# Patient Record
Sex: MALE | Race: BLACK OR AFRICAN AMERICAN | NOT HISPANIC OR LATINO
[De-identification: names, ages, dates, MRNs, and addresses within clinical notes are randomized per-mention and may not be internally consistent; named-entity substitution may affect disease eponyms.]

---

## 2018-11-20 ENCOUNTER — RECORD ABSTRACTING (OUTPATIENT)
Age: 83
End: 2018-11-20

## 2018-11-20 DIAGNOSIS — Z87.891 PERSONAL HISTORY OF NICOTINE DEPENDENCE: ICD-10-CM

## 2018-11-20 DIAGNOSIS — N52.9 MALE ERECTILE DYSFUNCTION, UNSPECIFIED: ICD-10-CM

## 2018-11-20 DIAGNOSIS — M54.32 SCIATICA, LEFT SIDE: ICD-10-CM

## 2018-11-20 DIAGNOSIS — I08.0 RHEUMATIC DISORDERS OF BOTH MITRAL AND AORTIC VALVES: ICD-10-CM

## 2018-11-20 DIAGNOSIS — Z86.79 PERSONAL HISTORY OF OTHER DISEASES OF THE CIRCULATORY SYSTEM: ICD-10-CM

## 2018-11-20 DIAGNOSIS — Z87.438 PERSONAL HISTORY OF OTHER DISEASES OF MALE GENITAL ORGANS: ICD-10-CM

## 2018-12-03 ENCOUNTER — APPOINTMENT (OUTPATIENT)
Dept: CARDIOLOGY | Facility: CLINIC | Age: 83
End: 2018-12-03
Payer: MEDICARE

## 2018-12-03 VITALS
HEIGHT: 71 IN | DIASTOLIC BLOOD PRESSURE: 64 MMHG | HEART RATE: 68 BPM | BODY MASS INDEX: 28.98 KG/M2 | SYSTOLIC BLOOD PRESSURE: 116 MMHG | WEIGHT: 207 LBS

## 2018-12-03 LAB — INR PPP: 8 RATIO

## 2018-12-03 PROCEDURE — 85610 PROTHROMBIN TIME: CPT | Mod: QW

## 2018-12-03 PROCEDURE — 36415 COLL VENOUS BLD VENIPUNCTURE: CPT

## 2018-12-03 PROCEDURE — 99213 OFFICE O/P EST LOW 20 MIN: CPT

## 2018-12-03 RX ORDER — TRAVOPROST 0.04 MG/ML
0 SOLUTION/ DROPS OPHTHALMIC
Qty: 10 | Refills: 0 | Status: COMPLETED | COMMUNITY
Start: 2018-03-09

## 2018-12-03 RX ORDER — BRINZOLAMIDE 10 MG/ML
1 SUSPENSION/ DROPS OPHTHALMIC
Qty: 20 | Refills: 0 | Status: COMPLETED | COMMUNITY
Start: 2018-11-26

## 2018-12-03 RX ORDER — BRIMONIDINE TARTRATE, TIMOLOL MALEATE 2; 5 MG/ML; MG/ML
0.2-0.5 SOLUTION/ DROPS OPHTHALMIC
Qty: 5 | Refills: 0 | Status: COMPLETED | COMMUNITY
Start: 2018-03-13

## 2018-12-03 NOTE — ASSESSMENT
[FreeTextEntry1] : In office INR today shows a value greater than 8. Patient denies any blood in stool or urine. Denies any new medications or withdrawal of any medication. No dietary changes There are no  signs of bleeding or bruising. Peripheral blood draw for INR and chemistry drawn to verify.  Recommend with hold Coumadin until repeat check later this week

## 2018-12-03 NOTE — REASON FOR VISIT
[FreeTextEntry1] : On Coumadin for atrial fibrillation returns today for INR. Denies bleeding or bruising

## 2018-12-03 NOTE — DISCUSSION/SUMMARY
[FreeTextEntry1] : repeat INR in  three-days.  safety precautions discussed at high risk for bleeding

## 2018-12-03 NOTE — REVIEW OF SYSTEMS
[Recent Weight Gain (___ Lbs)] : no recent weight gain [Recent Weight Loss (___ Lbs)] : no recent weight loss [Shortness Of Breath] : no shortness of breath [Chest Pain] : no chest pain [Lower Ext Edema] : no extremity edema [Palpitations] : no palpitations [Cough] : no cough [Change In The Stool] : no change in stool [Easy Bleeding] : no tendency for easy bleeding [Easy Bruising] : no tendency for easy bruising

## 2018-12-03 NOTE — PHYSICAL EXAM
[Normal Appearance] : normal appearance [General Appearance - In No Acute Distress] : no acute distress [Auscultation Breath Sounds / Voice Sounds] : lungs were clear to auscultation bilaterally [Heart Sounds] : normal S1 and S2 [Abdomen Soft] : soft [Abnormal Walk] : normal gait [FreeTextEntry1] : Evidence of bleeding or bruising

## 2018-12-04 LAB
ALBUMIN SERPL ELPH-MCNC: 4 G/DL
ALP BLD-CCNC: 97 U/L
ALT SERPL-CCNC: 14 U/L
ANION GAP SERPL CALC-SCNC: 11 MMOL/L
AST SERPL-CCNC: 24 U/L
BILIRUB SERPL-MCNC: 1 MG/DL
BUN SERPL-MCNC: 14 MG/DL
CALCIUM SERPL-MCNC: 9.6 MG/DL
CHLORIDE SERPL-SCNC: 108 MMOL/L
CO2 SERPL-SCNC: 27 MMOL/L
CREAT SERPL-MCNC: 1.01 MG/DL
GLUCOSE SERPL-MCNC: 84 MG/DL
INR PPP: 10.88 RATIO
POTASSIUM SERPL-SCNC: 4.5 MMOL/L
PROT SERPL-MCNC: 7.4 G/DL
PT BLD: 133.5 SEC
SODIUM SERPL-SCNC: 146 MMOL/L

## 2018-12-07 ENCOUNTER — APPOINTMENT (OUTPATIENT)
Dept: CARDIOLOGY | Facility: CLINIC | Age: 83
End: 2018-12-07
Payer: MEDICARE

## 2018-12-07 LAB — INR PPP: 3.7 RATIO

## 2018-12-07 PROCEDURE — 99213 OFFICE O/P EST LOW 20 MIN: CPT

## 2018-12-07 PROCEDURE — 85610 PROTHROMBIN TIME: CPT | Mod: QW

## 2018-12-07 NOTE — REASON FOR VISIT
[FreeTextEntry1] : On Coumadin for atrial fibrillation returns today for INR. Denies bleeding or bruising.  His INR was 7.7 earlier this week. Coumadin has been on hold.  No obvious reason for elevation in INR , careful med review with patient and his wife

## 2018-12-07 NOTE — ASSESSMENT
[FreeTextEntry1] : INR down to 3.7 today.  Unknown reason for her recent elevation will resume Coumadin at 2.5 mg daily and recheck INR in 7-10 days.  Complete medication list given to wife will also verify home medications again

## 2018-12-18 ENCOUNTER — APPOINTMENT (OUTPATIENT)
Dept: CARDIOLOGY | Facility: CLINIC | Age: 83
End: 2018-12-18
Payer: MEDICARE

## 2018-12-18 ENCOUNTER — RESULT CHARGE (OUTPATIENT)
Age: 83
End: 2018-12-18

## 2018-12-18 LAB — INR PPP: 1.5 RATIO

## 2018-12-18 PROCEDURE — 85610 PROTHROMBIN TIME: CPT | Mod: QW

## 2018-12-18 PROCEDURE — 99213 OFFICE O/P EST LOW 20 MIN: CPT

## 2018-12-18 NOTE — REASON FOR VISIT
[FreeTextEntry1] : On Coumadin for atrial fibrillation returns today for INR. Denies bleeding or bruising.  Previously hiis INR was 7.7  Coumadin was held and dose adjusted..  No obvious reason for elevation in INR , Careful med review with patient and his wife.  Returns today for repeat

## 2018-12-18 NOTE — DISCUSSION/SUMMARY
[FreeTextEntry1] : increase coumadin to 5mg daily.  Vitamin K intake discussed.  return to office for INR 10 days

## 2018-12-28 ENCOUNTER — APPOINTMENT (OUTPATIENT)
Dept: CARDIOLOGY | Facility: CLINIC | Age: 83
End: 2018-12-28
Payer: MEDICARE

## 2018-12-28 LAB — INR PPP: 2 RATIO

## 2018-12-28 PROCEDURE — 85610 PROTHROMBIN TIME: CPT | Mod: QW

## 2018-12-28 PROCEDURE — 99212 OFFICE O/P EST SF 10 MIN: CPT

## 2018-12-28 NOTE — ASSESSMENT
[FreeTextEntry1] : Continue coumadin dose at 5mg daily.  Recheck INR in 3 weeks.  Vitamin K intake and consistent medication intake discussed again

## 2018-12-28 NOTE — REASON FOR VISIT
[FreeTextEntry1] : On Coumadin for atrial fibrillation returns today for INR. Denies bleeding or bruising.  \par Labile INR lately

## 2019-01-16 ENCOUNTER — APPOINTMENT (OUTPATIENT)
Dept: CARDIOLOGY | Facility: CLINIC | Age: 84
End: 2019-01-16
Payer: MEDICARE

## 2019-01-16 VITALS — DIASTOLIC BLOOD PRESSURE: 60 MMHG | SYSTOLIC BLOOD PRESSURE: 118 MMHG

## 2019-01-16 LAB — INR PPP: 3 RATIO

## 2019-01-16 PROCEDURE — 99213 OFFICE O/P EST LOW 20 MIN: CPT

## 2019-01-16 PROCEDURE — 85610 PROTHROMBIN TIME: CPT | Mod: QW

## 2019-01-16 NOTE — REASON FOR VISIT
[FreeTextEntry1] : On Coumadin for atrial fibrillation returns today for INR. Denies bleeding or bruising.  \par

## 2019-01-16 NOTE — ASSESSMENT
[FreeTextEntry1] : Therapeutic anticoagulation. Continue current Coumadin dose recheck INR one month

## 2019-02-15 ENCOUNTER — APPOINTMENT (OUTPATIENT)
Dept: CARDIOLOGY | Facility: CLINIC | Age: 84
End: 2019-02-15
Payer: MEDICARE

## 2019-02-15 LAB — INR PPP: 2.6 RATIO

## 2019-02-15 PROCEDURE — 85610 PROTHROMBIN TIME: CPT | Mod: QW

## 2019-02-15 NOTE — REASON FOR VISIT
[Anticoagulation] : anticoagulation [Atrial Fibrillation] : atrial fibrillation [FreeTextEntry1] : On Coumadin for atrial fibrillation returns today for INR. Denies bleeding or bruising.  \par

## 2019-03-15 ENCOUNTER — APPOINTMENT (OUTPATIENT)
Dept: CARDIOLOGY | Facility: CLINIC | Age: 84
End: 2019-03-15
Payer: MEDICARE

## 2019-03-15 VITALS
WEIGHT: 202 LBS | BODY MASS INDEX: 28.17 KG/M2 | SYSTOLIC BLOOD PRESSURE: 144 MMHG | DIASTOLIC BLOOD PRESSURE: 80 MMHG | HEART RATE: 64 BPM

## 2019-03-15 LAB — INR PPP: 2.2 RATIO

## 2019-03-15 PROCEDURE — 99213 OFFICE O/P EST LOW 20 MIN: CPT

## 2019-03-15 PROCEDURE — 85610 PROTHROMBIN TIME: CPT | Mod: QW

## 2019-03-15 NOTE — HISTORY OF PRESENT ILLNESS
[FreeTextEntry1] : 83-year-old male with history of atrial fibrillation, hypertension, hyperlipidemia, aortic regurgitation, mitral regurgitation, BPH, cerebrovascular disease, and glaucoma

## 2019-03-15 NOTE — ASSESSMENT
[FreeTextEntry1] : Therapeutic anticoagulation. Continue current Coumadin dose recheck INR one month\par \par Blood pressure with some elevation today. Will continue to monitor

## 2019-05-01 ENCOUNTER — APPOINTMENT (OUTPATIENT)
Dept: CARDIOLOGY | Facility: CLINIC | Age: 84
End: 2019-05-01
Payer: MEDICARE

## 2019-05-01 ENCOUNTER — NON-APPOINTMENT (OUTPATIENT)
Age: 84
End: 2019-05-01

## 2019-05-01 VITALS
HEART RATE: 49 BPM | SYSTOLIC BLOOD PRESSURE: 130 MMHG | WEIGHT: 197 LBS | HEIGHT: 71 IN | BODY MASS INDEX: 27.58 KG/M2 | DIASTOLIC BLOOD PRESSURE: 60 MMHG

## 2019-05-01 LAB — INR PPP: 1.7 RATIO

## 2019-05-01 PROCEDURE — 93000 ELECTROCARDIOGRAM COMPLETE: CPT

## 2019-05-01 PROCEDURE — 99213 OFFICE O/P EST LOW 20 MIN: CPT

## 2019-05-01 RX ORDER — METOPROLOL SUCCINATE 25 MG/1
25 TABLET, EXTENDED RELEASE ORAL DAILY
Qty: 90 | Refills: 3 | Status: DISCONTINUED | COMMUNITY
End: 2019-05-01

## 2019-05-01 NOTE — DISCUSSION/SUMMARY
[FreeTextEntry1] : The patient is doing very well clinically. I find his cardiovascular status to be stable. His INR was slightly low today at 1.7 the warfarin will be adjusted slightly upwards and dosage is 7.5 mg 3 days and 5 mg 4 days. Medications will be continued and I have asked the patient to maintain a program of regular walking exercise.Today's EKG reveals atrial fibrillation with a slow ventricular rate of 49 beats per minute. This is down from 56 at the last visit. I am therefore recommending that metoprolol be decreased from 25 mg metoprolol succinate ER to 12.5 mg.

## 2019-05-01 NOTE — PHYSICAL EXAM
[Normal Appearance] : normal appearance [General Appearance - Well Developed] : well developed [General Appearance - Well Nourished] : well nourished [No Deformities] : no deformities [Well Groomed] : well groomed [General Appearance - In No Acute Distress] : no acute distress [Normal Conjunctiva] : the conjunctiva exhibited no abnormalities [Eyelids - No Xanthelasma] : the eyelids demonstrated no xanthelasmas [No Oral Pallor] : no oral pallor [Normal Oral Mucosa] : normal oral mucosa [Normal Jugular Venous V Waves Present] : normal jugular venous V waves present [No Oral Cyanosis] : no oral cyanosis [Normal Jugular Venous A Waves Present] : normal jugular venous A waves present [No Jugular Venous [place] A Waves] : no jugular venous leavitt A waves [Auscultation Breath Sounds / Voice Sounds] : lungs were clear to auscultation bilaterally [Exaggerated Use Of Accessory Muscles For Inspiration] : no accessory muscle use [Respiration, Rhythm And Depth] : normal respiratory rhythm and effort [Heart Rate And Rhythm] : heart rate and rhythm were normal [Murmurs] : no murmurs present [Heart Sounds] : normal S1 and S2 [Abdomen Tenderness] : non-tender [Abdomen Soft] : soft [Abdomen Mass (___ Cm)] : no abdominal mass palpated [Petechial Hemorrhages (___cm)] : no petechial hemorrhages [Nail Clubbing] : no clubbing of the fingernails [Cyanosis, Localized] : no localized cyanosis [] : no ischemic changes [Skin Color & Pigmentation] : normal skin color and pigmentation [Skin Lesions] : no skin lesions [No Venous Stasis] : no venous stasis [No Skin Ulcers] : no skin ulcer [No Xanthoma] : no  xanthoma was observed [Oriented To Time, Place, And Person] : oriented to person, place, and time [Affect] : the affect was normal [No Anxiety] : not feeling anxious [Mood] : the mood was normal

## 2019-05-01 NOTE — REASON FOR VISIT
[FreeTextEntry1] : The patient is followed with the principal diagnosis of atrial fibrillation. also hypertension. The patient's clinical condition is stable he denies any cardiac symptoms. He walks 2 miles on a daily basis.

## 2019-05-01 NOTE — HISTORY OF PRESENT ILLNESS
[FreeTextEntry1] : There have been no symptoms of chest pain shortness of breath dizziness lightheadedness or palpitations.

## 2019-05-24 ENCOUNTER — APPOINTMENT (OUTPATIENT)
Dept: CARDIOLOGY | Facility: CLINIC | Age: 84
End: 2019-05-24
Payer: MEDICARE

## 2019-05-24 VITALS
DIASTOLIC BLOOD PRESSURE: 90 MMHG | BODY MASS INDEX: 27.75 KG/M2 | SYSTOLIC BLOOD PRESSURE: 150 MMHG | HEART RATE: 61 BPM | WEIGHT: 199 LBS

## 2019-05-24 LAB — INR PPP: 1.7 RATIO

## 2019-05-24 PROCEDURE — 85610 PROTHROMBIN TIME: CPT | Mod: QW

## 2019-05-24 PROCEDURE — 99213 OFFICE O/P EST LOW 20 MIN: CPT

## 2019-05-24 RX ORDER — METOPROLOL TARTRATE 25 MG/1
25 TABLET, FILM COATED ORAL DAILY
Qty: 45 | Refills: 3 | Status: DISCONTINUED | COMMUNITY
Start: 2019-05-01 | End: 2019-05-24

## 2019-05-24 NOTE — ASSESSMENT
[FreeTextEntry1] : On med review patient reports that he has been out of losartan and has perhaps been off this medication for the last 2 months. He is instructed to resume losartan 25 mg daily  recheck blood pressure next visit\par \par \par INR slightly low, Coumadin, adjusted slightly. Patient reports he has been taking boost supplements for lack of appetite consistent vitamin K intake thoroughly discussed

## 2019-05-24 NOTE — REVIEW OF SYSTEMS
[Recent Weight Loss (___ Lbs)] : no recent weight loss [Recent Weight Gain (___ Lbs)] : no recent weight gain [Shortness Of Breath] : no shortness of breath [Chest Pain] : no chest pain [Lower Ext Edema] : no extremity edema [Palpitations] : no palpitations [Cough] : no cough [Change In The Stool] : no change in stool [Easy Bruising] : no tendency for easy bruising [Easy Bleeding] : no tendency for easy bleeding

## 2019-05-24 NOTE — PHYSICAL EXAM
[Normal Appearance] : normal appearance [General Appearance - In No Acute Distress] : no acute distress [Auscultation Breath Sounds / Voice Sounds] : lungs were clear to auscultation bilaterally [Heart Sounds] : normal S1 and S2 [Abdomen Soft] : soft [Abnormal Walk] : normal gait [FreeTextEntry1] : irreg rhythm, 1+ edema bilat LE

## 2019-05-31 ENCOUNTER — RX RENEWAL (OUTPATIENT)
Age: 84
End: 2019-05-31

## 2019-06-10 ENCOUNTER — APPOINTMENT (OUTPATIENT)
Dept: CARDIOLOGY | Facility: CLINIC | Age: 84
End: 2019-06-10
Payer: MEDICARE

## 2019-06-10 VITALS — SYSTOLIC BLOOD PRESSURE: 120 MMHG | WEIGHT: 199 LBS | DIASTOLIC BLOOD PRESSURE: 60 MMHG | BODY MASS INDEX: 27.75 KG/M2

## 2019-06-10 LAB — INR PPP: 3 RATIO

## 2019-06-10 PROCEDURE — 85610 PROTHROMBIN TIME: CPT | Mod: QW

## 2019-06-10 PROCEDURE — 99213 OFFICE O/P EST LOW 20 MIN: CPT

## 2019-06-10 NOTE — REASON FOR VISIT
[Atrial Fibrillation] : atrial fibrillation [Anticoagulation] : anticoagulation [FreeTextEntry1] : On Coumadin for atrial fibrillation returns today for INR. Denies bleeding or bruising.  \par

## 2019-06-10 NOTE — REVIEW OF SYSTEMS
[Recent Weight Gain (___ Lbs)] : no recent weight gain [Chest Pain] : no chest pain [Recent Weight Loss (___ Lbs)] : no recent weight loss [Shortness Of Breath] : no shortness of breath [Lower Ext Edema] : no extremity edema [Palpitations] : no palpitations [Easy Bleeding] : no tendency for easy bleeding [Cough] : no cough [Change In The Stool] : no change in stool [Easy Bruising] : no tendency for easy bruising

## 2019-06-10 NOTE — ASSESSMENT
[FreeTextEntry1] : Therapeutic anticoagulation. Continue current treatment\par \par Blood pressure controlled continue current treatment

## 2019-07-11 ENCOUNTER — APPOINTMENT (OUTPATIENT)
Dept: CARDIOLOGY | Facility: CLINIC | Age: 84
End: 2019-07-11
Payer: MEDICARE

## 2019-07-11 VITALS
DIASTOLIC BLOOD PRESSURE: 60 MMHG | BODY MASS INDEX: 27.86 KG/M2 | HEIGHT: 71 IN | OXYGEN SATURATION: 97 % | SYSTOLIC BLOOD PRESSURE: 118 MMHG | HEART RATE: 58 BPM | WEIGHT: 199 LBS

## 2019-07-11 LAB — INR PPP: 2.8 RATIO

## 2019-07-11 PROCEDURE — 99213 OFFICE O/P EST LOW 20 MIN: CPT

## 2019-07-11 PROCEDURE — 85610 PROTHROMBIN TIME: CPT | Mod: QW

## 2019-07-11 NOTE — REASON FOR VISIT
[Anticoagulation] : anticoagulation [Atrial Fibrillation] : atrial fibrillation [Spouse] : spouse [Hypertension] : hypertension [FreeTextEntry1] : On Coumadin for atrial fibrillation. Returns today for INR. Denies bleeding or bruising.\par \par HR 58-60. Did not decrease metoprolol succinate to 12.5mg daily. No c/o dizziness or lightheadedness.

## 2019-07-11 NOTE — DISCUSSION/SUMMARY
[___ Month(s)] : [unfilled] month(s) [FreeTextEntry3] : for repeat INR [FreeTextEntry1] : \par Therapeutic INR with current Coumadin dose. Continue regimen.\par \par Continue all medications.\par \par Return in 1 month.

## 2019-07-11 NOTE — PHYSICAL EXAM
[Well Groomed] : well groomed [General Appearance - In No Acute Distress] : no acute distress [Respiration, Rhythm And Depth] : normal respiratory rhythm and effort [Auscultation Breath Sounds / Voice Sounds] : lungs were clear to auscultation bilaterally [Heart Sounds] : normal S1 and S2 [Abdomen Soft] : soft [Abnormal Walk] : normal gait [] : no rash [Oriented To Time, Place, And Person] : oriented to person, place, and time [Impaired Insight] : insight and judgment were intact [Affect] : the affect was normal [FreeTextEntry1] : HR irregularly irregular, trace bilateral lower extremity edema

## 2019-07-11 NOTE — REVIEW OF SYSTEMS
[Fever] : no fever [Headache] : no headache [Recent Weight Gain (___ Lbs)] : no recent weight gain [Chills] : no chills [Recent Weight Loss (___ Lbs)] : no recent weight loss [Feeling Fatigued] : not feeling fatigued [Blurry Vision] : no blurred vision [Shortness Of Breath] : no shortness of breath [Chest  Pressure] : no chest pressure [Dyspnea on exertion] : not dyspnea during exertion [Chest Pain] : no chest pain [Lower Ext Edema] : no extremity edema [Leg Claudication] : no intermittent leg claudication [Palpitations] : no palpitations [Cough] : no cough [Abdominal Pain] : no abdominal pain [Muscle Cramps] : no muscle cramps [Skin: A Rash] : no rash: [Excessive Thirst] : no polydipsia [Dizziness] : no dizziness [Easy Bruising] : no tendency for easy bruising [Easy Bleeding] : no tendency for easy bleeding

## 2019-07-30 ENCOUNTER — RX RENEWAL (OUTPATIENT)
Age: 84
End: 2019-07-30

## 2019-08-15 ENCOUNTER — APPOINTMENT (OUTPATIENT)
Dept: CARDIOLOGY | Facility: CLINIC | Age: 84
End: 2019-08-15
Payer: MEDICARE

## 2019-08-15 VITALS
HEIGHT: 71 IN | OXYGEN SATURATION: 95 % | SYSTOLIC BLOOD PRESSURE: 144 MMHG | HEART RATE: 62 BPM | DIASTOLIC BLOOD PRESSURE: 72 MMHG | BODY MASS INDEX: 27.72 KG/M2 | WEIGHT: 198 LBS

## 2019-08-15 LAB — INR PPP: 1.8 RATIO

## 2019-08-15 PROCEDURE — 85610 PROTHROMBIN TIME: CPT | Mod: QW

## 2019-08-15 PROCEDURE — 99213 OFFICE O/P EST LOW 20 MIN: CPT

## 2019-08-15 NOTE — DISCUSSION/SUMMARY
[With Me] : with me [___ Week(s)] : [unfilled] week(s) [FreeTextEntry1] : Change Coumadin regimen to 5mg x 6 days and 2.5mg x 1 day. Counseled on consistent Vitamin K intake. Repeat INR in 2 weeks.  [FreeTextEntry3] : repeat INR check

## 2019-08-15 NOTE — REASON FOR VISIT
[Anticoagulation] : anticoagulation [Atrial Fibrillation] : atrial fibrillation [Hypertension] : hypertension [Spouse] : spouse [FreeTextEntry1] : On Coumadin for atrial fibrillation. Returns today for INR. Denies bleeding or bruising. \par \par Denies chest pain, SOB, palpitations or dizziness.

## 2019-08-15 NOTE — REVIEW OF SYSTEMS
[Fever] : no fever [Headache] : no headache [Recent Weight Gain (___ Lbs)] : no recent weight gain [Chills] : no chills [Feeling Fatigued] : not feeling fatigued [Recent Weight Loss (___ Lbs)] : no recent weight loss [Blurry Vision] : no blurred vision [Dyspnea on exertion] : not dyspnea during exertion [Shortness Of Breath] : no shortness of breath [Chest Pain] : no chest pain [Chest  Pressure] : no chest pressure [Lower Ext Edema] : no extremity edema [Leg Claudication] : no intermittent leg claudication [Palpitations] : no palpitations [Cough] : no cough [Abdominal Pain] : no abdominal pain [Muscle Cramps] : no muscle cramps [Skin: A Rash] : no rash: [Dizziness] : no dizziness [Excessive Thirst] : no polydipsia [Easy Bruising] : no tendency for easy bruising [Easy Bleeding] : no tendency for easy bleeding

## 2019-08-15 NOTE — PHYSICAL EXAM
[Well Groomed] : well groomed [General Appearance - In No Acute Distress] : no acute distress [Auscultation Breath Sounds / Voice Sounds] : lungs were clear to auscultation bilaterally [Heart Sounds] : normal S1 and S2 [Abdomen Soft] : soft [Abnormal Walk] : normal gait [] : no rash [Oriented To Time, Place, And Person] : oriented to person, place, and time [Impaired Insight] : insight and judgment were intact [Affect] : the affect was normal [FreeTextEntry1] : HR irregularly irregular, trace bilateral lower extremity edema

## 2019-08-29 ENCOUNTER — APPOINTMENT (OUTPATIENT)
Dept: CARDIOLOGY | Facility: CLINIC | Age: 84
End: 2019-08-29
Payer: MEDICARE

## 2019-08-29 VITALS
OXYGEN SATURATION: 97 % | SYSTOLIC BLOOD PRESSURE: 132 MMHG | BODY MASS INDEX: 27.16 KG/M2 | HEIGHT: 71 IN | WEIGHT: 194 LBS | HEART RATE: 69 BPM | DIASTOLIC BLOOD PRESSURE: 78 MMHG

## 2019-08-29 LAB — INR PPP: 1.4 RATIO

## 2019-08-29 PROCEDURE — 99213 OFFICE O/P EST LOW 20 MIN: CPT

## 2019-08-29 PROCEDURE — 85610 PROTHROMBIN TIME: CPT | Mod: QW

## 2019-08-29 NOTE — REVIEW OF SYSTEMS
[Fever] : no fever [Recent Weight Gain (___ Lbs)] : no recent weight gain [Headache] : no headache [Chills] : no chills [Blurry Vision] : no blurred vision [Feeling Fatigued] : not feeling fatigued [Recent Weight Loss (___ Lbs)] : no recent weight loss [Shortness Of Breath] : no shortness of breath [Chest  Pressure] : no chest pressure [Dyspnea on exertion] : not dyspnea during exertion [Lower Ext Edema] : no extremity edema [Leg Claudication] : no intermittent leg claudication [Chest Pain] : no chest pain [Palpitations] : no palpitations [Cough] : no cough [Abdominal Pain] : no abdominal pain [Skin: A Rash] : no rash: [Muscle Cramps] : no muscle cramps [Dizziness] : no dizziness [Confusion] : no confusion was observed [Excessive Thirst] : no polydipsia [Easy Bruising] : no tendency for easy bruising [Easy Bleeding] : no tendency for easy bleeding

## 2019-08-29 NOTE — REASON FOR VISIT
[Atrial Fibrillation] : atrial fibrillation [Anticoagulation] : anticoagulation [Spouse] : spouse [FreeTextEntry1] : Subtherapeutic INR at last visit, 1.8. Coumadin dosing changed to 7.5mg x 6 days and 5mg x 1 day. \par \par Denies s/s of bleeding or easy bruising. \par \par Denies any change in diet or intake of Vitamin K rich food. No new medications except for over the counter Miralax and stool softener.  Denies missed doses of Coumadin.

## 2019-08-29 NOTE — ASSESSMENT
[FreeTextEntry1] : Remains with subtherapeutic INR., 1.3 initially with repeat 1.4. \par \par Instructed to take 10mg Coumadin tonight then start 7.5mg daily. Repeat INR in 2 weeks. \par \par Continue monitoring diet and avoid Vitamin K rich food including green leafy vegetables, teas, pomegranate or cranberry juice. Advised to avoid high protein diet. \par

## 2019-08-29 NOTE — PHYSICAL EXAM
[Well Groomed] : well groomed [General Appearance - In No Acute Distress] : no acute distress [Respiration, Rhythm And Depth] : normal respiratory rhythm and effort [Auscultation Breath Sounds / Voice Sounds] : lungs were clear to auscultation bilaterally [Heart Sounds] : normal S1 and S2 [FreeTextEntry1] : HR irregularly irregular, trace bilateral lower extremity edema [Abdomen Soft] : soft [Oriented To Time, Place, And Person] : oriented to person, place, and time [Abnormal Walk] : normal gait [] : no rash [Affect] : the affect was normal [Impaired Insight] : insight and judgment were intact

## 2019-09-11 ENCOUNTER — APPOINTMENT (OUTPATIENT)
Dept: CARDIOLOGY | Facility: CLINIC | Age: 84
End: 2019-09-11

## 2019-09-13 ENCOUNTER — APPOINTMENT (OUTPATIENT)
Dept: CARDIOLOGY | Facility: CLINIC | Age: 84
End: 2019-09-13
Payer: MEDICARE

## 2019-09-13 LAB — INR PPP: 1.7 RATIO

## 2019-09-13 PROCEDURE — 99211 OFF/OP EST MAY X REQ PHY/QHP: CPT

## 2019-09-13 PROCEDURE — 85610 PROTHROMBIN TIME: CPT | Mod: QW

## 2019-09-13 NOTE — ASSESSMENT
[FreeTextEntry1] : improtance of med compliance discussed\par \par extra 5mg today then continue 7.5mg daily, wife will assist with making sure dose is correct\par \par recheck INR 2w

## 2019-09-13 NOTE — REASON FOR VISIT
[Anticoagulation] : anticoagulation [Atrial Fibrillation] : atrial fibrillation [FreeTextEntry1] : On Coumadin for atrial fibrillation returns today for INR. Denies bleeding or bruising.  \par INR has been low, dose increased.  Wife is questioning if he took the increased dose

## 2019-09-27 ENCOUNTER — APPOINTMENT (OUTPATIENT)
Dept: CARDIOLOGY | Facility: CLINIC | Age: 84
End: 2019-09-27
Payer: MEDICARE

## 2019-09-27 VITALS
HEART RATE: 68 BPM | SYSTOLIC BLOOD PRESSURE: 128 MMHG | DIASTOLIC BLOOD PRESSURE: 70 MMHG | WEIGHT: 202 LBS | BODY MASS INDEX: 28.17 KG/M2

## 2019-09-27 DIAGNOSIS — Z51.81 ENCOUNTER FOR THERAPEUTIC DRUG LVL MONITORING: ICD-10-CM

## 2019-09-27 LAB — INR PPP: 1.8 RATIO

## 2019-09-27 PROCEDURE — 99213 OFFICE O/P EST LOW 20 MIN: CPT

## 2019-09-27 PROCEDURE — 85610 PROTHROMBIN TIME: CPT | Mod: QW

## 2019-09-27 PROCEDURE — 90653 IIV ADJUVANT VACCINE IM: CPT

## 2019-09-27 PROCEDURE — G0008: CPT

## 2019-09-27 NOTE — REASON FOR VISIT
[Atrial Fibrillation] : atrial fibrillation [Anticoagulation] : anticoagulation [FreeTextEntry1] : On Coumadin for atrial fibrillation returns today for INR. Denies bleeding or bruising.  \par INR has been low, dose increased last 2 visits

## 2019-09-27 NOTE — REVIEW OF SYSTEMS
[Recent Weight Gain (___ Lbs)] : no recent weight gain [Shortness Of Breath] : no shortness of breath [Recent Weight Loss (___ Lbs)] : no recent weight loss [Lower Ext Edema] : no extremity edema [Chest Pain] : no chest pain [Palpitations] : no palpitations [Cough] : no cough [Change In The Stool] : no change in stool [Easy Bleeding] : no tendency for easy bleeding [Easy Bruising] : no tendency for easy bruising

## 2019-09-27 NOTE — ASSESSMENT
[FreeTextEntry1] : importance of med compliance discussed\par \par slightly low INR today, but improved from last check.  Dose has been increased last 2 visits.  Will continue at this dose for now and reassess next visit\par \par

## 2019-10-08 ENCOUNTER — RX RENEWAL (OUTPATIENT)
Age: 84
End: 2019-10-08

## 2019-10-21 ENCOUNTER — APPOINTMENT (OUTPATIENT)
Dept: CARDIOLOGY | Facility: CLINIC | Age: 84
End: 2019-10-21
Payer: MEDICARE

## 2019-10-21 VITALS
BODY MASS INDEX: 28.45 KG/M2 | WEIGHT: 204 LBS | DIASTOLIC BLOOD PRESSURE: 60 MMHG | HEART RATE: 62 BPM | SYSTOLIC BLOOD PRESSURE: 134 MMHG

## 2019-10-21 LAB — INR PPP: 2 RATIO

## 2019-10-21 PROCEDURE — 85610 PROTHROMBIN TIME: CPT | Mod: QW

## 2019-10-21 PROCEDURE — 99213 OFFICE O/P EST LOW 20 MIN: CPT

## 2019-10-21 NOTE — REVIEW OF SYSTEMS
[Recent Weight Gain (___ Lbs)] : no recent weight gain [Shortness Of Breath] : no shortness of breath [Recent Weight Loss (___ Lbs)] : no recent weight loss [Chest Pain] : no chest pain [Lower Ext Edema] : no extremity edema [Cough] : no cough [Change In The Stool] : no change in stool [Palpitations] : no palpitations [Easy Bleeding] : no tendency for easy bleeding [Easy Bruising] : no tendency for easy bruising

## 2019-11-04 ENCOUNTER — NON-APPOINTMENT (OUTPATIENT)
Age: 84
End: 2019-11-04

## 2019-11-04 ENCOUNTER — APPOINTMENT (OUTPATIENT)
Dept: CARDIOLOGY | Facility: CLINIC | Age: 84
End: 2019-11-04
Payer: MEDICARE

## 2019-11-04 VITALS
WEIGHT: 205 LBS | BODY MASS INDEX: 28.7 KG/M2 | SYSTOLIC BLOOD PRESSURE: 132 MMHG | HEART RATE: 51 BPM | DIASTOLIC BLOOD PRESSURE: 60 MMHG | HEIGHT: 71 IN

## 2019-11-04 PROCEDURE — 93000 ELECTROCARDIOGRAM COMPLETE: CPT

## 2019-11-04 PROCEDURE — 99214 OFFICE O/P EST MOD 30 MIN: CPT

## 2019-11-04 RX ORDER — FLUTICASONE PROPIONATE 50 MCG
50 SPRAY, SUSPENSION NASAL
Refills: 0 | Status: DISCONTINUED | COMMUNITY
End: 2019-11-04

## 2019-11-04 NOTE — PHYSICAL EXAM
[General Appearance - Well Developed] : well developed [Normal Appearance] : normal appearance [Well Groomed] : well groomed [General Appearance - Well Nourished] : well nourished [No Deformities] : no deformities [General Appearance - In No Acute Distress] : no acute distress [Normal Conjunctiva] : the conjunctiva exhibited no abnormalities [Eyelids - No Xanthelasma] : the eyelids demonstrated no xanthelasmas [Normal Oral Mucosa] : normal oral mucosa [No Oral Pallor] : no oral pallor [No Oral Cyanosis] : no oral cyanosis [Normal Jugular Venous A Waves Present] : normal jugular venous A waves present [Normal Jugular Venous V Waves Present] : normal jugular venous V waves present [No Jugular Venous [place] A Waves] : no jugular venous leavitt A waves [Respiration, Rhythm And Depth] : normal respiratory rhythm and effort [Exaggerated Use Of Accessory Muscles For Inspiration] : no accessory muscle use [Auscultation Breath Sounds / Voice Sounds] : lungs were clear to auscultation bilaterally [Heart Rate And Rhythm] : heart rate and rhythm were normal [Heart Sounds] : normal S1 and S2 [Murmurs] : no murmurs present [Abdomen Soft] : soft [Abdomen Tenderness] : non-tender [Abdomen Mass (___ Cm)] : no abdominal mass palpated [Abnormal Walk] : normal gait [Gait - Sufficient For Exercise Testing] : the gait was sufficient for exercise testing [Nail Clubbing] : no clubbing of the fingernails [Cyanosis, Localized] : no localized cyanosis [Petechial Hemorrhages (___cm)] : no petechial hemorrhages [Skin Color & Pigmentation] : normal skin color and pigmentation [] : no rash [No Venous Stasis] : no venous stasis [Skin Lesions] : no skin lesions [No Skin Ulcers] : no skin ulcer [No Xanthoma] : no  xanthoma was observed [Oriented To Time, Place, And Person] : oriented to person, place, and time [Affect] : the affect was normal [Mood] : the mood was normal [No Anxiety] : not feeling anxious

## 2019-11-04 NOTE — DISCUSSION/SUMMARY
[FreeTextEntry1] : The patient's cardiac status remained stable the patient is followed with chronic atrial fibrillation and mild valvular disease as I have described. The examination reveals a grade 1-2 systolic murmur at the lower left sternal border and apex. The electrocardiogram reveals atrial fibrillation with controlled ventricular rate 59 no acute changes are noted. I have asked the patient to continue his current medications. He receives warfarin anticoagulation which is followed regularly. Of great concern is the need for her general primary medical followup and the patient assures me that he will be seeing Dr. Chun for this.

## 2019-11-15 ENCOUNTER — RX RENEWAL (OUTPATIENT)
Age: 84
End: 2019-11-15

## 2019-11-18 ENCOUNTER — RX RENEWAL (OUTPATIENT)
Age: 84
End: 2019-11-18

## 2019-11-20 ENCOUNTER — APPOINTMENT (OUTPATIENT)
Dept: CARDIOLOGY | Facility: CLINIC | Age: 84
End: 2019-11-20
Payer: MEDICARE

## 2019-11-20 VITALS
SYSTOLIC BLOOD PRESSURE: 126 MMHG | DIASTOLIC BLOOD PRESSURE: 60 MMHG | WEIGHT: 209 LBS | BODY MASS INDEX: 29.15 KG/M2 | HEART RATE: 65 BPM

## 2019-11-20 LAB — INR PPP: 2.3 RATIO

## 2019-11-20 PROCEDURE — 99213 OFFICE O/P EST LOW 20 MIN: CPT

## 2019-11-20 PROCEDURE — 85610 PROTHROMBIN TIME: CPT | Mod: QW

## 2019-11-20 NOTE — PHYSICAL EXAM
[General Appearance - In No Acute Distress] : no acute distress [Auscultation Breath Sounds / Voice Sounds] : lungs were clear to auscultation bilaterally [Normal Appearance] : normal appearance [Heart Sounds] : normal S1 and S2 [Abnormal Walk] : normal gait [Abdomen Soft] : soft [FreeTextEntry1] : no Evidence of bleeding or bruising

## 2019-11-20 NOTE — REASON FOR VISIT
[Anticoagulation] : anticoagulation [Atrial Fibrillation] : atrial fibrillation [FreeTextEntry1] : On Coumadin for atrial fibrillation returns today for INR. Denies bleeding or bruising.  \par  [Spouse] : spouse

## 2019-11-20 NOTE — REVIEW OF SYSTEMS
[Shortness Of Breath] : no shortness of breath [Recent Weight Gain (___ Lbs)] : no recent weight gain [Recent Weight Loss (___ Lbs)] : no recent weight loss [Palpitations] : no palpitations [Lower Ext Edema] : no extremity edema [Chest Pain] : no chest pain [Cough] : no cough [Change In The Stool] : no change in stool [Easy Bruising] : no tendency for easy bruising [Easy Bleeding] : no tendency for easy bleeding

## 2019-12-12 ENCOUNTER — APPOINTMENT (OUTPATIENT)
Dept: FAMILY MEDICINE | Facility: CLINIC | Age: 84
End: 2019-12-12
Payer: MEDICARE

## 2019-12-12 VITALS
BODY MASS INDEX: 28.84 KG/M2 | SYSTOLIC BLOOD PRESSURE: 120 MMHG | WEIGHT: 206 LBS | DIASTOLIC BLOOD PRESSURE: 70 MMHG | HEIGHT: 71 IN

## 2019-12-12 DIAGNOSIS — H40.9 UNSPECIFIED GLAUCOMA: ICD-10-CM

## 2019-12-12 DIAGNOSIS — H25.9 UNSPECIFIED AGE-RELATED CATARACT: ICD-10-CM

## 2019-12-12 PROCEDURE — 36415 COLL VENOUS BLD VENIPUNCTURE: CPT

## 2019-12-12 PROCEDURE — 99214 OFFICE O/P EST MOD 30 MIN: CPT | Mod: 25

## 2019-12-12 NOTE — PHYSICAL EXAM
[No Focal Deficits] : no focal deficits [No Acute Distress] : no acute distress [Clear to Auscultation] : lungs were clear to auscultation bilaterally [de-identified] : Unable to calculate meaningful MMSE due to visual impairment, but Orientation and recall are intact [de-identified] : irreg/irreg

## 2019-12-12 NOTE — PLAN
[FreeTextEntry1] : Routine labs today.\par Schedule CPE.\par Pt recalls flu vax done at Dr Zavaleta's office. Will check record.

## 2019-12-12 NOTE — HISTORY OF PRESENT ILLNESS
[FreeTextEntry8] : "I thought this was a physical"\par \par \par Pt reports that spouse is concerned about his mental status.\par He feels that he is fine.

## 2019-12-12 NOTE — REVIEW OF SYSTEMS
[Fever] : no fever [Chills] : no chills [Vision Problems] : vision problems [Chest Pain] : no chest pain [Cough] : no cough [Palpitations] : no palpitations [Abdominal Pain] : no abdominal pain [Headache] : no headache [Constipation] : constipation [Confusion] : no confusion [Unsteady Walk] : no ataxia [Dizziness] : no dizziness [FreeTextEntry7] : HHard stool. Moves bowels every other day.

## 2019-12-18 ENCOUNTER — APPOINTMENT (OUTPATIENT)
Dept: CARDIOLOGY | Facility: CLINIC | Age: 84
End: 2019-12-18
Payer: MEDICARE

## 2019-12-18 VITALS
SYSTOLIC BLOOD PRESSURE: 118 MMHG | DIASTOLIC BLOOD PRESSURE: 70 MMHG | HEART RATE: 67 BPM | WEIGHT: 207 LBS | BODY MASS INDEX: 28.87 KG/M2

## 2019-12-18 LAB — INR PPP: 2 RATIO

## 2019-12-18 PROCEDURE — 85610 PROTHROMBIN TIME: CPT | Mod: QW

## 2019-12-18 PROCEDURE — 99213 OFFICE O/P EST LOW 20 MIN: CPT

## 2019-12-18 NOTE — REVIEW OF SYSTEMS
[Recent Weight Loss (___ Lbs)] : no recent weight loss [Recent Weight Gain (___ Lbs)] : no recent weight gain [Shortness Of Breath] : no shortness of breath [Chest Pain] : no chest pain [Lower Ext Edema] : no extremity edema [Palpitations] : no palpitations [Change In The Stool] : no change in stool [Cough] : no cough [Easy Bleeding] : no tendency for easy bleeding [Easy Bruising] : no tendency for easy bruising

## 2019-12-18 NOTE — REASON FOR VISIT
[Atrial Fibrillation] : atrial fibrillation [Anticoagulation] : anticoagulation [Spouse] : spouse [FreeTextEntry1] : On Coumadin for atrial fibrillation returns today for INR. Denies bleeding or bruising.  \par

## 2019-12-18 NOTE — PHYSICAL EXAM
[Normal Appearance] : normal appearance [General Appearance - In No Acute Distress] : no acute distress [Auscultation Breath Sounds / Voice Sounds] : lungs were clear to auscultation bilaterally [Heart Sounds] : normal S1 and S2 [Abdomen Soft] : soft [Abnormal Walk] : normal gait [FreeTextEntry1] : no Evidence of bleeding or bruising

## 2019-12-24 LAB
ALBUMIN SERPL ELPH-MCNC: 4.2 G/DL
ALP BLD-CCNC: 104 U/L
ALT SERPL-CCNC: 16 U/L
ANION GAP SERPL CALC-SCNC: 13 MMOL/L
AST SERPL-CCNC: 25 U/L
BASOPHILS # BLD AUTO: 0.03 K/UL
BASOPHILS NFR BLD AUTO: 0.9 %
BILIRUB SERPL-MCNC: 1 MG/DL
BUN SERPL-MCNC: 13 MG/DL
CALCIUM SERPL-MCNC: 9.5 MG/DL
CHLORIDE SERPL-SCNC: 105 MMOL/L
CHOLEST SERPL-MCNC: 103 MG/DL
CHOLEST/HDLC SERPL: 2.9 RATIO
CO2 SERPL-SCNC: 24 MMOL/L
CREAT SERPL-MCNC: 0.88 MG/DL
EOSINOPHIL # BLD AUTO: 0.04 K/UL
EOSINOPHIL NFR BLD AUTO: 1.2 %
GLUCOSE SERPL-MCNC: 124 MG/DL
HCT VFR BLD CALC: 45.7 %
HDLC SERPL-MCNC: 36 MG/DL
HGB BLD-MCNC: 14.5 G/DL
IMM GRANULOCYTES NFR BLD AUTO: 0.3 %
LDLC SERPL CALC-MCNC: 47 MG/DL
LYMPHOCYTES # BLD AUTO: 1.5 K/UL
LYMPHOCYTES NFR BLD AUTO: 46.3 %
MAN DIFF?: NORMAL
MCHC RBC-ENTMCNC: 28.4 PG
MCHC RBC-ENTMCNC: 31.7 GM/DL
MCV RBC AUTO: 89.6 FL
MONOCYTES # BLD AUTO: 0.3 K/UL
MONOCYTES NFR BLD AUTO: 9.3 %
NEUTROPHILS # BLD AUTO: 1.36 K/UL
NEUTROPHILS NFR BLD AUTO: 42 %
PLATELET # BLD AUTO: 121 K/UL
POTASSIUM SERPL-SCNC: 3.8 MMOL/L
PROT SERPL-MCNC: 7.5 G/DL
RBC # BLD: 5.1 M/UL
RBC # FLD: 14.6 %
SODIUM SERPL-SCNC: 142 MMOL/L
TRIGL SERPL-MCNC: 98 MG/DL
WBC # FLD AUTO: 3.24 K/UL

## 2020-01-22 ENCOUNTER — APPOINTMENT (OUTPATIENT)
Dept: CARDIOLOGY | Facility: CLINIC | Age: 85
End: 2020-01-22
Payer: MEDICARE

## 2020-01-22 VITALS
HEART RATE: 64 BPM | BODY MASS INDEX: 29.01 KG/M2 | WEIGHT: 208 LBS | DIASTOLIC BLOOD PRESSURE: 60 MMHG | SYSTOLIC BLOOD PRESSURE: 124 MMHG

## 2020-01-22 LAB — INR PPP: 2.5 RATIO

## 2020-01-22 PROCEDURE — 85610 PROTHROMBIN TIME: CPT | Mod: QW

## 2020-01-22 PROCEDURE — 99213 OFFICE O/P EST LOW 20 MIN: CPT

## 2020-01-22 NOTE — PHYSICAL EXAM
[Normal Appearance] : normal appearance [General Appearance - In No Acute Distress] : no acute distress [Heart Sounds] : normal S1 and S2 [Auscultation Breath Sounds / Voice Sounds] : lungs were clear to auscultation bilaterally [Abdomen Soft] : soft [Abnormal Walk] : normal gait [FreeTextEntry1] : no Evidence of bleeding or bruising

## 2020-01-22 NOTE — REVIEW OF SYSTEMS
[Recent Weight Gain (___ Lbs)] : no recent weight gain [Recent Weight Loss (___ Lbs)] : no recent weight loss [Shortness Of Breath] : no shortness of breath [Chest Pain] : no chest pain [Lower Ext Edema] : no extremity edema [Palpitations] : no palpitations [Cough] : no cough [Easy Bleeding] : no tendency for easy bleeding [Change In The Stool] : no change in stool [Easy Bruising] : no tendency for easy bruising

## 2020-02-26 ENCOUNTER — APPOINTMENT (OUTPATIENT)
Dept: CARDIOLOGY | Facility: CLINIC | Age: 85
End: 2020-02-26

## 2020-03-04 ENCOUNTER — APPOINTMENT (OUTPATIENT)
Dept: CARDIOLOGY | Facility: CLINIC | Age: 85
End: 2020-03-04
Payer: MEDICARE

## 2020-03-04 VITALS
WEIGHT: 209 LBS | OXYGEN SATURATION: 97 % | SYSTOLIC BLOOD PRESSURE: 120 MMHG | HEART RATE: 81 BPM | BODY MASS INDEX: 29.15 KG/M2 | DIASTOLIC BLOOD PRESSURE: 78 MMHG

## 2020-03-04 LAB — INR PPP: 1.7 RATIO

## 2020-03-04 PROCEDURE — 99213 OFFICE O/P EST LOW 20 MIN: CPT

## 2020-03-04 PROCEDURE — 85610 PROTHROMBIN TIME: CPT | Mod: QW

## 2020-03-04 NOTE — REASON FOR VISIT
[Anticoagulation] : anticoagulation [Atrial Fibrillation] : atrial fibrillation [Spouse] : spouse [FreeTextEntry1] : On Coumadin for atrial fibrillation returns today for INR. Denies bleeding or bruising.  \par

## 2020-03-04 NOTE — REVIEW OF SYSTEMS
[Recent Weight Gain (___ Lbs)] : no recent weight gain [Recent Weight Loss (___ Lbs)] : no recent weight loss [Shortness Of Breath] : no shortness of breath [Chest Pain] : no chest pain [Lower Ext Edema] : no extremity edema [Palpitations] : no palpitations [Change In The Stool] : no change in stool [Cough] : no cough [Easy Bleeding] : no tendency for easy bleeding [Easy Bruising] : no tendency for easy bruising

## 2020-03-10 ENCOUNTER — APPOINTMENT (OUTPATIENT)
Dept: FAMILY MEDICINE | Facility: CLINIC | Age: 85
End: 2020-03-10

## 2020-04-08 ENCOUNTER — APPOINTMENT (OUTPATIENT)
Dept: CARDIOLOGY | Facility: CLINIC | Age: 85
End: 2020-04-08

## 2020-04-15 ENCOUNTER — APPOINTMENT (OUTPATIENT)
Dept: CARDIOLOGY | Facility: CLINIC | Age: 85
End: 2020-04-15
Payer: MEDICARE

## 2020-04-15 LAB — INR PPP: 2.1 RATIO

## 2020-04-15 PROCEDURE — 85610 PROTHROMBIN TIME: CPT | Mod: QW

## 2020-05-18 ENCOUNTER — APPOINTMENT (OUTPATIENT)
Dept: CARDIOLOGY | Facility: CLINIC | Age: 85
End: 2020-05-18
Payer: MEDICARE

## 2020-05-18 ENCOUNTER — NON-APPOINTMENT (OUTPATIENT)
Age: 85
End: 2020-05-18

## 2020-05-18 VITALS
WEIGHT: 201 LBS | HEIGHT: 71 IN | SYSTOLIC BLOOD PRESSURE: 120 MMHG | HEART RATE: 61 BPM | DIASTOLIC BLOOD PRESSURE: 70 MMHG | BODY MASS INDEX: 28.14 KG/M2

## 2020-05-18 LAB — INR PPP: 2.4 RATIO

## 2020-05-18 PROCEDURE — 93306 TTE W/DOPPLER COMPLETE: CPT

## 2020-05-18 PROCEDURE — 93000 ELECTROCARDIOGRAM COMPLETE: CPT

## 2020-05-18 PROCEDURE — 99214 OFFICE O/P EST MOD 30 MIN: CPT | Mod: 25

## 2020-05-18 NOTE — DISCUSSION/SUMMARY
[FreeTextEntry1] : the main finding on today's examination there is moderate to severe leg edema bilaterally also ankle edema. In spite of this the patientshows a weight loss from the last visit. Perhaps one of these readings was inaccurate. here has been no respiratory difficulty. There is no paroxysmal nocturnal dyspnea but the patient does suffer from insomnia. today's exam reveals clear lung fields there is a systolic murmur at the left sternal border the legs  and ankles revealed moderate to severe edema. Performed an echocardiogram revealing normal LV systolic function, severely dilated left and right atria moderate MR moderate TR estimated PA pressure is 30 somewhat dilated right ventricle. In order to alleviate the patient's edema I am recommending furosemide to be started at 20 mg daily.Also I have advised the patient to limit his consumption of salt and sodium.\par \par

## 2020-05-18 NOTE — PHYSICAL EXAM
[General Appearance - Well Developed] : well developed [Normal Appearance] : normal appearance [Well Groomed] : well groomed [General Appearance - Well Nourished] : well nourished [No Deformities] : no deformities [General Appearance - In No Acute Distress] : no acute distress [Eyelids - No Xanthelasma] : the eyelids demonstrated no xanthelasmas [Normal Conjunctiva] : the conjunctiva exhibited no abnormalities [Normal Oral Mucosa] : normal oral mucosa [No Oral Pallor] : no oral pallor [No Oral Cyanosis] : no oral cyanosis [Normal Jugular Venous A Waves Present] : normal jugular venous A waves present [Normal Jugular Venous V Waves Present] : normal jugular venous V waves present [No Jugular Venous Leavitt A Waves] : no jugular venous leavitt A waves [Respiration, Rhythm And Depth] : normal respiratory rhythm and effort [Exaggerated Use Of Accessory Muscles For Inspiration] : no accessory muscle use [Auscultation Breath Sounds / Voice Sounds] : lungs were clear to auscultation bilaterally [Abdomen Soft] : soft [Abdomen Tenderness] : non-tender [Abdomen Mass (___ Cm)] : no abdominal mass palpated [Abnormal Walk] : normal gait [Gait - Sufficient For Exercise Testing] : the gait was sufficient for exercise testing [] : no rash [Skin Color & Pigmentation] : normal skin color and pigmentation [No Venous Stasis] : no venous stasis [Skin Lesions] : no skin lesions [No Skin Ulcers] : no skin ulcer [No Xanthoma] : no  xanthoma was observed [Affect] : the affect was normal [Oriented To Time, Place, And Person] : oriented to person, place, and time [Mood] : the mood was normal [No Anxiety] : not feeling anxious [FreeTextEntry1] : moderate to severe edema of the legs and ankles appear

## 2020-05-18 NOTE — HISTORY OF PRESENT ILLNESS
[FreeTextEntry1] : his general medical condition has been stable except that in recent weeks he has developed significant leg and ankle edema. This may have been due to excessive sodium consumption. Apparently the patient loves hot dogs. He denies symptoms of shortness of breath. There has been no chest pain or discomfort.

## 2020-05-20 ENCOUNTER — APPOINTMENT (OUTPATIENT)
Dept: CARDIOLOGY | Facility: CLINIC | Age: 85
End: 2020-05-20

## 2020-06-08 ENCOUNTER — APPOINTMENT (OUTPATIENT)
Dept: CARDIOLOGY | Facility: CLINIC | Age: 85
End: 2020-06-08
Payer: MEDICARE

## 2020-06-08 VITALS
SYSTOLIC BLOOD PRESSURE: 130 MMHG | HEART RATE: 60 BPM | DIASTOLIC BLOOD PRESSURE: 60 MMHG | BODY MASS INDEX: 25.94 KG/M2 | WEIGHT: 186 LBS | OXYGEN SATURATION: 100 %

## 2020-06-08 LAB — INR PPP: 1.6 RATIO

## 2020-06-08 PROCEDURE — 36415 COLL VENOUS BLD VENIPUNCTURE: CPT

## 2020-06-08 PROCEDURE — 99213 OFFICE O/P EST LOW 20 MIN: CPT

## 2020-06-08 PROCEDURE — 85610 PROTHROMBIN TIME: CPT | Mod: QW

## 2020-06-08 NOTE — PHYSICAL EXAM
[Normal Appearance] : normal appearance [General Appearance - In No Acute Distress] : no acute distress [] : no respiratory distress [Heart Sounds] : normal S1 and S2 [Skin Color & Pigmentation] : normal skin color and pigmentation [Abnormal Walk] : normal gait [FreeTextEntry1] : no evidence of bleeding or bruising

## 2020-06-08 NOTE — REVIEW OF SYSTEMS
[Recent Weight Loss (___ Lbs)] : recent [unfilled] ~Ulb weight loss [see HPI] : see HPI [Shortness Of Breath] : no shortness of breath [Dyspnea on exertion] : not dyspnea during exertion [Chest Pain] : no chest pain [Lower Ext Edema] : lower extremity edema [Palpitations] : no palpitations [Cough] : no cough [Easy Bleeding] : no tendency for easy bleeding [Easy Bruising] : no tendency for easy bruising

## 2020-06-10 LAB
ALBUMIN SERPL ELPH-MCNC: 3.7 G/DL
ALP BLD-CCNC: 102 U/L
ALT SERPL-CCNC: 14 U/L
ANION GAP SERPL CALC-SCNC: 12 MMOL/L
AST SERPL-CCNC: 25 U/L
BILIRUB SERPL-MCNC: 1.2 MG/DL
BUN SERPL-MCNC: 13 MG/DL
CALCIUM SERPL-MCNC: 9.4 MG/DL
CHLORIDE SERPL-SCNC: 104 MMOL/L
CO2 SERPL-SCNC: 23 MMOL/L
CREAT SERPL-MCNC: 1 MG/DL
GLUCOSE SERPL-MCNC: 112 MG/DL
POTASSIUM SERPL-SCNC: 3.9 MMOL/L
PROT SERPL-MCNC: 7.7 G/DL
SODIUM SERPL-SCNC: 140 MMOL/L

## 2020-06-24 ENCOUNTER — APPOINTMENT (OUTPATIENT)
Dept: CARDIOLOGY | Facility: CLINIC | Age: 85
End: 2020-06-24
Payer: MEDICARE

## 2020-06-24 VITALS
HEART RATE: 60 BPM | SYSTOLIC BLOOD PRESSURE: 132 MMHG | WEIGHT: 183 LBS | BODY MASS INDEX: 25.52 KG/M2 | DIASTOLIC BLOOD PRESSURE: 60 MMHG

## 2020-06-24 LAB — INR PPP: 2.2 RATIO

## 2020-06-24 PROCEDURE — 99212 OFFICE O/P EST SF 10 MIN: CPT

## 2020-06-24 PROCEDURE — 85610 PROTHROMBIN TIME: CPT | Mod: QW

## 2020-06-24 NOTE — PHYSICAL EXAM
[Normal Appearance] : normal appearance [] : no respiratory distress [General Appearance - In No Acute Distress] : no acute distress [Heart Sounds] : normal S1 and S2 [Abnormal Walk] : normal gait [Skin Color & Pigmentation] : normal skin color and pigmentation [FreeTextEntry1] : no evidence of bleeding or bruising

## 2020-06-24 NOTE — REVIEW OF SYSTEMS
[see HPI] : see HPI [Recent Weight Loss (___ Lbs)] : no recent weight loss [Shortness Of Breath] : no shortness of breath [Chest Pain] : no chest pain [Lower Ext Edema] : no extremity edema [Dyspnea on exertion] : not dyspnea during exertion [Easy Bleeding] : no tendency for easy bleeding [Palpitations] : no palpitations [Cough] : no cough [Easy Bruising] : no tendency for easy bruising

## 2020-06-24 NOTE — REASON FOR VISIT
[Anticoagulation] : anticoagulation [Atrial Fibrillation] : atrial fibrillation [FreeTextEntry1] : Last visit with Dr Meghann lyle added for edema.  Arnie reports decreased LE edema, denies SOB, no CP or palps On Coumadin for atrial fibrillation Denies bleeding or bruising.  \par

## 2020-07-31 ENCOUNTER — APPOINTMENT (OUTPATIENT)
Dept: CARDIOLOGY | Facility: CLINIC | Age: 85
End: 2020-07-31
Payer: MEDICARE

## 2020-07-31 LAB — INR PPP: 1.9 RATIO

## 2020-07-31 PROCEDURE — 85610 PROTHROMBIN TIME: CPT | Mod: QW

## 2020-07-31 NOTE — REVIEW OF SYSTEMS
[Palpitations] : no palpitations [Easy Bleeding] : no tendency for easy bleeding [Chest Pain] : no chest pain [Shortness Of Breath] : no shortness of breath [Easy Bruising] : no tendency for easy bruising

## 2020-07-31 NOTE — HISTORY OF PRESENT ILLNESS
[FreeTextEntry1] : 84 year old male with chronic atrial fibrillation on warfarin, hypertension, hyperlipidemia, aortic regurgitation, BPH.

## 2020-07-31 NOTE — REASON FOR VISIT
82 year old woman admitted with poor oral intake and dehydration    She had an unwitnessed fall recently and developed a right femoral fracture.  She has a past medical history of dementia, depression, CAD, gastroesophageal reflux disease, hypertension, dyslipidemia, osteoporosis, rheumatoid arthritis, CK D stage III, hypothyroidism, dilated cardiomyopathy with ICD in place.     She presents with decreased p.o. Intake, lethargy and hypoxemia. She has had questionable subjective fevers denies any cough abdominal pain nausea vomiting or urinary type symptoms.   She had an ultrasound done which was negative for DVT. Chest x-ray negative. Patient sent for VQ scan which was negative.  (see below)    Past Medical History:   Diagnosis Date   • Anxiety    • Arthritis    • CAD (coronary artery disease)     s/p remote angioplasty and stent   • Cardiac arrest (CMS/[facility])     s/p ICD implant    • CKD (chronic kidney disease), stage III 03/01/2006   • Compression fracture of T12 vertebra (CMS/[facility])    • Dehydration    • Depression    • GERD (gastroesophageal reflux disease)    • HLD (hyperlipidemia)    • HTN (hypertension)    • Hypothyroid 07/01/2008   • Left ventricular dysfunction     EF 53% 08/2011   • Long Q-T syndrome     congenital   • LV dysfunction- 35-40% in 06/2009 improved to 53% per ECG in 08/2011    • NYHA class IIA    • Osteoporosis 06/20/2009   • Polymorphic ventricular tachycardia (CMS/HCC)    • RA (rheumatoid arthritis) (CMS/HCC)    • Renal insufficiency 03/01/2006   • RLS (restless legs syndrome)    • Seasonal allergies    • Seizure disorder, grand mal (CMS/[facility]) 9/30/2013   • Senile dementia, uncomplicated    • Syncope    • TB (tuberculosis)     childhood   • Urinary tract infection, site not specified        Past Surgical History:   Procedure Laterality Date   • ANKLE BRACHIAL INDEX  08/03/2011    Normal GUANAKO's   • CDL LEFT HEART CATH  06/30/2006    70-75% stenosis of mid LAD, stent LAD, EF 58-60%   • CDL R  AND L HEART CATH  05/15/2009    Rt heart cath w/angio-sed placement. Mild coronary artery disease. 25-30% stenosis of mid anterior LT descending.   • CT HEAD WO CONTRAST  09/11/2011    Minimal right sphenoid sinus disease.   • DEXA BONE DENSITY AXIAL SKELETON  04/20/2005   • ECHO HEART RESTING  08/02/2011    done @ [facility], LVEF 53%, Mild MVR, Trace mild TR   • EP ICD IMPLANT  06/21/2009    Medtronic Secura ICD   • REMV CATARACT EXTRACAP INSERT LENS      Cataract Removal Lens Implant   • XR CHEST AP OR PA  09/11/2011    Minimal left basilar atelectasis.       Patient Active Problem List   Diagnosis   • Anxiety state, unspecified   • CAD (coronary artery disease)   • Depression with anxiety   • Fatigue   • Fibromyalgia   • GERD (gastroesophageal reflux disease)   • Hyperlipidemia   • Hypertension   • Insomnia   • Osteoarthritis   • Osteoporosis   • RLS (restless legs syndrome)   • RA (rheumatoid arthritis) (CMS/[facility])   • Urinary incontinence   • VT (ventricular tachycardia) (CMS/[facility])   • Vitamin D deficiency   • Single chamber ICD Medtronic 6/21/2009   • NYHA class IIA   • LV dysfunction   • CKD (chronic kidney disease), stage III   • Hypothyroid   • Chronic pain   • IBS (irritable bowel syndrome)   • Long Q-T syndrome   • Cardiac arrest (CMS/[facility])   • Dilated cardiomyopathy (CMS/[facility])   • Urinary tract infection, site not specified   • Dehydration   • Diarrhea   • Anemia, unspecified   • Lumbago   • Inability to walk   • Weakness generalized   • Closed fracture of neck of right femur with routine healing   • Falls frequently   • Leucocytosis   • Stage 3 chronic kidney disease   • Other specified hypothyroidism   • Dementia   • Acute UTI   • Generalized weakness   • Dehydration   • AFIA (acute kidney injury) (CMS/[facility])       Family History   Problem Relation Age of Onset   • Heart disease Mother      enlarged heart   • Cancer Sister    • Stroke Sister    • COPD Brother    • High cholesterol Daughter    • Arthritis Sister    •  Cancer Sister      skin   • COPD Sister    • Cancer Sister    • Heart disease Sister    • Arthritis Sister      Rickets   • Cancer Brother      colon   • Cancer Brother      throat   • High cholesterol Daughter    • Arthritis Daughter        Social History     Social History   • Marital status:      Spouse name: N/A   • Number of children: N/A   • Years of education: N/A     Occupational History   • Not on file.     Social History Main Topics   • Smoking status: Never Smoker   • Smokeless tobacco: Never Used   • Alcohol use No      Comment: one glass of wine a month   • Drug use: No   • Sexual activity: Not on file     Other Topics Concern   • Not on file     Social History Narrative   • No narrative on file       Current Facility-Administered Medications   Medication Dose Route Frequency Provider Last Rate Last Dose   • VANCOMYCIN - PHARMACIST MONITORED   Does not apply See Admin Instructions Rigo Acosta MD       • metroNIDAZOLE (FLAGYL) IVPB 500 mg  500 mg Intravenous 3 times per day Rigo Acosta  mL/hr at 11/07/17 2135 500 mg at 11/07/17 2135   • [START ON 11/8/2017] vancomycin 1,250 mg in sodium chloride 0.9% 250 mL IVPB  1,250 mg Intravenous Every Other Day Rigo Acosta MD       • aztreonam (AZACTAM) syringe 1,000 mg  1,000 mg Intravenous 3 times per day Rigo Aocsta MD   1,000 mg at 11/07/17 2130   • acetaminophen (TYLENOL) tablet 325 mg  325 mg Oral 4x Daily Amari Casarez MD   325 mg at 11/07/17 1807   • sodium chloride 0.9% infusion   Intravenous Continuous Amari Casarez MD 50 mL/hr at 11/07/17 1304     • LORazepam (ATIVAN) tablet 0.5 mg  0.5 mg Oral BID NAWAF Ward   0.5 mg at 11/07/17 1807   • [START ON 11/8/2017] lamoTRIgine (LaMICtal) tablet 50 mg  50 mg Oral Daily Rey Patel MD       • aspirin (ECOTRIN) enteric coated tablet 81 mg  81 mg Oral Daily Rigo Acosta MD   81 mg at 11/07/17 0926   • mirtazapine (REMERON) tablet 15 mg  15 mg Oral  Nightly Rigo Acosta MD   15 mg at 11/07/17 2123   • sertraline (ZOLOFT) tablet 100 mg  100 mg Oral QHS Rigo Acosta MD   100 mg at 11/07/17 2123   • atorvastatin (LIPITOR) tablet 40 mg  40 mg Oral Nightly Rigo Acosta MD   40 mg at 11/07/17 2123   • rOPINIRole (REQUIP) tablet 0.5 mg  0.5 mg Oral Daily Rigo Acosta MD   0.5 mg at 11/07/17 0926   • atenolol (TENORMIN) tablet 25 mg  25 mg Oral Daily Rigo STAS Acosta MD   25 mg at 11/07/17 0926   • amLODIPine (NORVASC) tablet 5 mg  5 mg Oral Daily Rigo M MD Dave   5 mg at 11/07/17 0926   • levothyroxine (SYNTHROID, LEVOTHROID) tablet 75 mcg  75 mcg Oral QHS Rigo  MD Dave   75 mcg at 11/07/17 2123   • famotidine (PEPCID) tablet 20 mg  20 mg Oral Nightly Rigo Acosta MD   20 mg at 11/07/17 2123   • sodium chloride (PF) 0.9 % injection 2 mL  2 mL Injection 2 times per day Rigo Acosta MD   2 mL at 11/06/17 2231   • sodium chloride (PF) 0.9 % injection 2 mL  2 mL Injection PRN Rigo Acosta MD       • sodium chloride 0.9 % flush bag 500 mL  500 mL Intravenous PRN Rigo Acosta MD   500 mL at 11/07/17 0205   • potassium chloride (K-DUR,KLOR-CON) CR tablet 20 mEq  20 mEq Oral Q4H PRN Rigo Acosta MD       • potassium chloride (KLOR-CON) packet 20 mEq  20 mEq Per NG tube Q4H PRN Rigo Acosta MD       • potassium chloride 20 mEq/100mL IVPB premix  20 mEq Intravenous Q4H PRN Rigo Acosta MD       • potassium chloride (K-DUR,KLOR-CON) CR tablet 40 mEq  40 mEq Oral Q4H PRN Rigo Acosta MD       • potassium chloride (KLOR-CON) packet 40 mEq  40 mEq Per NG tube Q4H PRN Rigo Acosta MD       • potassium chloride 20 mEq/100mL IVPB premix  40 mEq Intravenous Q4H PRN Rigo Acosta MD       • sodium chloride (NORMAL SALINE) 0.9 % bolus 500 mL  500 mL Intravenous PRN Rigo Acosta MD       • nitroGLYcerin (NITROSTAT) sublingual tablet 0.4 mg  0.4 mg Sublingual Q5 Min PRN Rigo Acosta MD       • heparin (porcine)  injection 5,000 Units  5,000 Units Subcutaneous 3 times per day Rigo Acosta MD   5,000 Units at 11/07/17 2123   • acetaminophen (TYLENOL) tablet 650 mg  650 mg Oral Q4H PRN Rigo Acosta MD       • magnesium sulfate 1 g in dextrose 5% 100 mL IVPB premix  1 g Intravenous Daily PRN Rigo Acosta MD       • magnesium sulfate 2 g in 50 mL premix IVPB  2 g Intravenous Daily PRN Rigo Acosta MD       • potassium phosphate/sodium phosphate (K PHOS NEUTRAL) tablet 1 tablet  250 mg Oral BID PRN Rigo Acosta MD       • calcium gluconate 2 g in dextrose 5 % 70 mL total volume IVPB  2 g Intravenous PRN Rigo Acosta MD           ALLERGIES:  Morphine sulfate; Fosamax [alendronate sodium]; and Omnicef [cefdinir]    ANKLE BRACHIAL INDEX                            08/03/2011      Comment: Normal GUANAKO's    CDL LEFT HEART CATH                             06/30/2006      Comment: 70-75% stenosis of mid LAD, stent LAD, EF                58-60%    CDL R AND L HEART CATH                          05/15/2009      Comment: Rt heart cath w/angio-sed placement. Mild                coronary artery disease. 25-30% stenosis of mid               anterior LT descending.    EP ICD IMPLANT                                  06/21/2009      Comment: Medtronic Secura ICD    ECHO HEART RESTING                              08/02/2011      Comment: done @ [facility], LVEF 53%, Mild MVR, Trace mild TR    DEXA BONE DENSITY AXIAL SKELETON                04/20/2005    XR CHEST AP OR PA                               09/11/2011      Comment: Minimal left basilar atelectasis.    CT HEAD WO CONTRAST                             09/11/2011      Comment: Minimal right sphenoid sinus disease.    REMV CATARACT EXTRACAP INSERT LENS                              Comment: Cataract Removal Lens Implant    WBC (K/mcL)   Date Value   11/07/2017 12.0 (H)     RBC (mil/mcL)   Date Value   11/07/2017 3.47 (L)     HCT (%)   Date Value   11/07/2017 33.1 (L)     HGB  (g/dL)   Date Value   11/07/2017 10.5 (L)     PLT (K/mcL)   Date Value   11/07/2017 426   10/06/2013 261       Sodium (mmol/L)   Date Value   11/07/2017 140     Potassium (mmol/L)   Date Value   11/07/2017 4.3     Chloride (mmol/L)   Date Value   11/07/2017 110 (H)     Glucose (mg/dL)   Date Value   11/07/2017 94     CALCIUM (mg/dL)   Date Value   11/07/2017 8.6     Carbon Dioxide (mmol/L)   Date Value   11/07/2017 21     BUN (mg/dL)   Date Value   11/07/2017 24 (H)     Creatinine (mg/dL)   Date Value   11/07/2017 1.28 (H)       AST/SGOT (Units/L)   Date Value   11/07/2017 30     ALT/SGPT (Units/L)   Date Value   11/07/2017 28     No results found for: GGTP  ALK PHOSPHATASE (Units/L)   Date Value   11/07/2017 114     TOTAL BILIRUBIN (mg/dL)   Date Value   11/07/2017 0.5       INR (no units)   Date Value   11/06/2017 1.2     Visit Vitals  /64 (BP Location: [facility], Patient Position: Right side lying)   Pulse 68   Temp 98.6 °F (37 °C) (Oral)   Resp 18   Ht 5' 5\" (1.651 m)   Wt 121 lb 11.1 oz (55.2 kg)   SpO2 95%   BMI 20.25 kg/m²   14 systems reviewed see HPI and database  Review of Systems:  Constitutional:  No fever, chills, or weight loss  HEENT: No headache, no diplopia, no double vision  Neck: Negative  Respiratory:  Cough no sputum, shortness of breath  Cardiovascular: No chest pain, no pedal edema  GI: No nausea, vomiting, constipation, diarrhea  :  No dysuria, frequency, or hematuria  Skin:  No rashes, no cellulitis   MS:  No myalgias, arthralgias  Neurologic:  No seizures, no fainting, no weakness in upper or lower extremities,  lethargy  Endocrine: No clinical features of thyroid dysfunction or diabetes mellitus  Immunologic: Negative  Hematologic: No bruising, no anticoagulation  Pysc: anxiety,  depression, no suicidal or homicidal ideation  Social: Negative  Breast negative  sleepy  Cranial nerves 2-12 grossly intact.  No jugular venous distension  No Palpable Lymphadenopathy in the head and neck area.   No thyromegaly.  Pupils are equally reactive to light and accomodation.  No pallor or jaundice.  First and second heart sounds are heard and regular.  No audible murmurs.  Abdomen is full, soft, not tender, no palpable masses, Normal bowel sounds.  Normal dorsalis pedis pulsation in the legs.  No paraspinal muscle spasms or tenderness in the lumbar spine.  No tenderness over the sacro-iliac joints or spinous processes of the lumbosacral spine.  Normal range of motion cervical spine.  Normal range of motion Lumbosacral spine.   Chest CTA bilateral  Reduced ROM right hip.    11/6/17 BILATERAL LOWER EXTREMITY DUPLEX VENOUS ULTRASOUND  IMPRESSION:  Negative for DVT, bilateral lower extremity.       11/6/17 NM PULMONARY VENTILATION AND PERFUSION   FINDINGS: There is homogeneous tracer uptake within both lungs on the  perfusion images . There are no segmental or subsegmental mismatched  perfusion defects to suggest pulmonary embolism. Apparent matched defect at  the anterior right lung base in the region of the right middle lobe is  likely related to high diaphragms seen on prior chest x-rays.    IMPRESSION: Low probability for PE.    10/26/17  XR HIP 2 VW RIGHT   HISTORY:  Right hip trauma status post fall.      COMPARISON:  Pelvic radiograph 10/5/2013.   FINDINGS/IMPRESSION:   Minimally comminuted, minimally displaced subcapital right femoral  fracture. The femoral head appears appropriately positioned within the  acetabulum.    82 year old woman with chronic pain syndrome  Chronic opioid therapy for pain control  Possible Sepsis   dehydration   Acute kidney injury on CKD stage III   Closed neck fracture of right femur  CAD   Chronic Atrial fibrillation  Dementia  Delirium  Depression and anxiety  Dyslipidemia  Restless leg syndrome    Tylenol 650 mg po q 4 hours prn  Tylenol 325 mg po qid  Heparin 5000 u sq q 8 hours  Hydrocodone 5/325 mg 1 tab po q 4 hours prn  lamictal 50 mg po qday  Ativan 0.5 mg po bid  requip 0.5  mg po qday    At home prior to admission on   hydrocodone with APAP 5/325 mg 1 tab po q 4 hours prn  Fentanyl patch 37 mcg and change q 72 hours applied to the skin    Sleepy drowsy  Lethargic  Hold fentanyl patch till more awake  Hold hydrocodone till more awake    With restart of fentanyl patch, restart at lower dose like 12 mcg   [Follow-Up - Clinic] : a clinic follow-up of [Anticoagulation] : anticoagulation [Atrial Fibrillation] : atrial fibrillation [FreeTextEntry1] : Mr. Soto denies s/s of bleeding or easy bruising.

## 2020-07-31 NOTE — PHYSICAL EXAM
[Normal Appearance] : normal appearance [Well Groomed] : well groomed [General Appearance - In No Acute Distress] : no acute distress [] : no respiratory distress [Abnormal Walk] : normal gait [Skin Color & Pigmentation] : normal skin color and pigmentation [Oriented To Time, Place, And Person] : oriented to person, place, and time [Impaired Insight] : insight and judgment were intact [Affect] : the affect was normal

## 2020-08-05 ENCOUNTER — RX RENEWAL (OUTPATIENT)
Age: 85
End: 2020-08-05

## 2020-08-07 ENCOUNTER — RX RENEWAL (OUTPATIENT)
Age: 85
End: 2020-08-07

## 2020-08-10 ENCOUNTER — RX RENEWAL (OUTPATIENT)
Age: 85
End: 2020-08-10

## 2020-08-18 ENCOUNTER — APPOINTMENT (OUTPATIENT)
Dept: CARDIOLOGY | Facility: CLINIC | Age: 85
End: 2020-08-18
Payer: MEDICARE

## 2020-08-18 PROCEDURE — 85610 PROTHROMBIN TIME: CPT | Mod: QW

## 2020-08-18 NOTE — REASON FOR VISIT
[Follow-Up - Clinic] : a clinic follow-up of [Anticoagulation] : anticoagulation [Atrial Fibrillation] : atrial fibrillation [FreeTextEntry1] : Mr. Soto denies s/s of bleeding or easy bruising.

## 2020-08-18 NOTE — REVIEW OF SYSTEMS
[Shortness Of Breath] : no shortness of breath [Chest Pain] : no chest pain [Palpitations] : no palpitations [Easy Bleeding] : no tendency for easy bleeding [Easy Bruising] : no tendency for easy bruising

## 2020-09-21 ENCOUNTER — APPOINTMENT (OUTPATIENT)
Dept: CARDIOLOGY | Facility: CLINIC | Age: 85
End: 2020-09-21
Payer: MEDICARE

## 2020-09-21 VITALS
OXYGEN SATURATION: 97 % | WEIGHT: 193 LBS | BODY MASS INDEX: 26.92 KG/M2 | HEART RATE: 74 BPM | SYSTOLIC BLOOD PRESSURE: 134 MMHG | DIASTOLIC BLOOD PRESSURE: 70 MMHG

## 2020-09-21 DIAGNOSIS — Z23 ENCOUNTER FOR IMMUNIZATION: ICD-10-CM

## 2020-09-21 LAB — INR PPP: 2.1 RATIO

## 2020-09-21 PROCEDURE — 90662 IIV NO PRSV INCREASED AG IM: CPT

## 2020-09-21 PROCEDURE — 99213 OFFICE O/P EST LOW 20 MIN: CPT

## 2020-09-21 PROCEDURE — 85610 PROTHROMBIN TIME: CPT | Mod: QW

## 2020-09-21 PROCEDURE — G0008: CPT

## 2020-09-21 NOTE — HISTORY OF PRESENT ILLNESS
[FreeTextEntry1] : 84-year-old male with history of atrial fibrillation, hypertension, hyperlipidemia, aortic regurgitation, mitral regurgitation, BPH, cerebrovascular disease, and glaucoma

## 2020-09-21 NOTE — REASON FOR VISIT
[Anticoagulation] : anticoagulation [Atrial Fibrillation] : atrial fibrillation [FreeTextEntry1] : denies SOB, no CP or palps On Coumadin for atrial fibrillation Denies bleeding or bruising.  \par

## 2020-09-21 NOTE — REVIEW OF SYSTEMS
[Recent Weight Loss (___ Lbs)] : recent [unfilled] ~Ulb weight loss [see HPI] : see HPI [Lower Ext Edema] : lower extremity edema [Shortness Of Breath] : no shortness of breath [Dyspnea on exertion] : not dyspnea during exertion [Chest Pain] : no chest pain [Palpitations] : no palpitations [Cough] : no cough [Easy Bleeding] : no tendency for easy bleeding [Easy Bruising] : no tendency for easy bruising

## 2020-09-21 NOTE — PHYSICAL EXAM
[Normal Appearance] : normal appearance [General Appearance - In No Acute Distress] : no acute distress [] : no respiratory distress [Heart Sounds] : normal S1 and S2 [Abnormal Walk] : normal gait [Skin Color & Pigmentation] : normal skin color and pigmentation [FreeTextEntry1] : no evidence of bleeding or bruising

## 2020-10-15 ENCOUNTER — RX RENEWAL (OUTPATIENT)
Age: 85
End: 2020-10-15

## 2020-10-23 ENCOUNTER — APPOINTMENT (OUTPATIENT)
Dept: CARDIOLOGY | Facility: CLINIC | Age: 85
End: 2020-10-23
Payer: MEDICARE

## 2020-10-23 VITALS
HEART RATE: 56 BPM | OXYGEN SATURATION: 100 % | DIASTOLIC BLOOD PRESSURE: 60 MMHG | WEIGHT: 205 LBS | SYSTOLIC BLOOD PRESSURE: 138 MMHG | BODY MASS INDEX: 28.59 KG/M2

## 2020-10-23 LAB — INR PPP: 2 RATIO

## 2020-10-23 PROCEDURE — 85610 PROTHROMBIN TIME: CPT | Mod: QW

## 2020-10-23 PROCEDURE — 99213 OFFICE O/P EST LOW 20 MIN: CPT

## 2020-10-23 NOTE — REVIEW OF SYSTEMS
[see HPI] : see HPI [Lower Ext Edema] : lower extremity edema [Recent Weight Gain (___ Lbs)] : recent [unfilled] ~Ulb weight gain [Shortness Of Breath] : no shortness of breath [Dyspnea on exertion] : not dyspnea during exertion [Chest Pain] : no chest pain [Palpitations] : no palpitations [Cough] : no cough [Easy Bleeding] : no tendency for easy bleeding [Easy Bruising] : no tendency for easy bruising

## 2020-10-23 NOTE — REASON FOR VISIT
[Anticoagulation] : anticoagulation [Atrial Fibrillation] : atrial fibrillation [FreeTextEntry1] :  On Coumadin for atrial fibrillation Denies bleeding or bruising.  \par Reports increased swelling LEs over the last week.  Denies SOB, no chest pain\par

## 2020-11-06 ENCOUNTER — APPOINTMENT (OUTPATIENT)
Dept: CARDIOLOGY | Facility: CLINIC | Age: 85
End: 2020-11-06
Payer: MEDICARE

## 2020-11-06 VITALS
HEART RATE: 70 BPM | SYSTOLIC BLOOD PRESSURE: 132 MMHG | HEIGHT: 71 IN | DIASTOLIC BLOOD PRESSURE: 72 MMHG | WEIGHT: 193 LBS | BODY MASS INDEX: 27.02 KG/M2 | OXYGEN SATURATION: 97 %

## 2020-11-06 PROCEDURE — 99213 OFFICE O/P EST LOW 20 MIN: CPT

## 2020-11-06 NOTE — HISTORY OF PRESENT ILLNESS
[FreeTextEntry1] : 85 year old male with chronic atrial fibrillation on warfarin, hypertension, hyperlipidemia, aortic regurgitation, BPH.

## 2020-11-06 NOTE — PHYSICAL EXAM
[Normal Appearance] : normal appearance [Well Groomed] : well groomed [General Appearance - In No Acute Distress] : no acute distress [] : no respiratory distress [Abnormal Walk] : normal gait [Oriented To Time, Place, And Person] : oriented to person, place, and time [Impaired Insight] : insight and judgment were intact [Affect] : the affect was normal [Respiration, Rhythm And Depth] : normal respiratory rhythm and effort [Bibasilar Rales/Crackles] : bibasilar rales [Heart Sounds] : normal S1 and S2 [Irregularly Irregular] : the rhythm was irregularly irregular [Cyanosis, Localized] : no localized cyanosis [Skin Color & Pigmentation] : normal skin color and pigmentation [FreeTextEntry1] : s

## 2020-11-06 NOTE — REVIEW OF SYSTEMS
[Headache] : no headache [Feeling Fatigued] : not feeling fatigued [Shortness Of Breath] : no shortness of breath [Chest Pain] : no chest pain [Lower Ext Edema] : lower extremity edema [Palpitations] : no palpitations [Dizziness] : no dizziness [Confusion] : no confusion was observed [Easy Bleeding] : no tendency for easy bleeding [Easy Bruising] : no tendency for easy bruising [FreeTextEntry2] : dry skin

## 2020-11-06 NOTE — REASON FOR VISIT
[Follow-Up - Clinic] : a clinic follow-up of [FreeTextEntry1] : At last visit, Lasix increased to 40mg daily d/t c/o increased LE swelling. \par \par Mr. Soto reports slight improvement of bilateral lower extremity swelling. He denies chest pain, SOB, palpitations, dizziness or syncope.\par

## 2020-11-20 ENCOUNTER — APPOINTMENT (OUTPATIENT)
Dept: CARDIOLOGY | Facility: CLINIC | Age: 85
End: 2020-11-20
Payer: MEDICARE

## 2020-11-20 VITALS
WEIGHT: 198 LBS | SYSTOLIC BLOOD PRESSURE: 130 MMHG | OXYGEN SATURATION: 98 % | HEART RATE: 54 BPM | DIASTOLIC BLOOD PRESSURE: 60 MMHG | BODY MASS INDEX: 27.62 KG/M2

## 2020-11-20 PROCEDURE — 99213 OFFICE O/P EST LOW 20 MIN: CPT

## 2020-11-20 PROCEDURE — 85610 PROTHROMBIN TIME: CPT | Mod: QW

## 2020-11-20 NOTE — PHYSICAL EXAM
[Normal Appearance] : normal appearance [General Appearance - In No Acute Distress] : no acute distress [] : no respiratory distress [Heart Sounds] : normal S1 and S2 [Abnormal Walk] : normal gait [Skin Color & Pigmentation] : normal skin color and pigmentation [Respiration, Rhythm And Depth] : normal respiratory rhythm and effort [Auscultation Breath Sounds / Voice Sounds] : lungs were clear to auscultation bilaterally [FreeTextEntry1] : no evidence of bleeding or bruising

## 2020-11-20 NOTE — REASON FOR VISIT
[Anticoagulation] : anticoagulation [Atrial Fibrillation] : atrial fibrillation [FreeTextEntry1] :  On Coumadin for atrial fibrillation Denies bleeding or bruising.  \par   Denies SOB, no chest pain\par On Lasix 40mg daily for LE edema, notes mild improvement\par

## 2020-11-20 NOTE — REVIEW OF SYSTEMS
[see HPI] : see HPI [Lower Ext Edema] : lower extremity edema [Shortness Of Breath] : no shortness of breath [Dyspnea on exertion] : not dyspnea during exertion [Chest Pain] : no chest pain [Palpitations] : no palpitations [Cough] : no cough [Joint Pain] : no joint pain [Muscle Cramps] : no muscle cramps [Dizziness] : no dizziness [Easy Bleeding] : no tendency for easy bleeding [Easy Bruising] : no tendency for easy bruising

## 2020-12-21 ENCOUNTER — APPOINTMENT (OUTPATIENT)
Dept: CARDIOLOGY | Facility: CLINIC | Age: 85
End: 2020-12-21

## 2021-01-08 ENCOUNTER — APPOINTMENT (OUTPATIENT)
Dept: CARDIOLOGY | Facility: CLINIC | Age: 86
End: 2021-01-08
Payer: MEDICARE

## 2021-01-08 VITALS
BODY MASS INDEX: 28.59 KG/M2 | HEART RATE: 67 BPM | DIASTOLIC BLOOD PRESSURE: 60 MMHG | SYSTOLIC BLOOD PRESSURE: 134 MMHG | OXYGEN SATURATION: 98 % | WEIGHT: 205 LBS

## 2021-01-08 LAB — INR PPP: 2.2 RATIO

## 2021-01-08 PROCEDURE — 99213 OFFICE O/P EST LOW 20 MIN: CPT

## 2021-01-08 PROCEDURE — 85610 PROTHROMBIN TIME: CPT | Mod: QW

## 2021-01-08 NOTE — REASON FOR VISIT
[Anticoagulation] : anticoagulation [Atrial Fibrillation] : atrial fibrillation [FreeTextEntry1] :  On Coumadin for atrial fibrillation Denies bleeding or bruising.  \par   Denies SOB, no chest pain.  Recovering from URI (states allergies), noted increased LE edema.\par Stopped lasix

## 2021-01-08 NOTE — PHYSICAL EXAM
[Normal Appearance] : normal appearance [General Appearance - In No Acute Distress] : no acute distress [] : no respiratory distress [Respiration, Rhythm And Depth] : normal respiratory rhythm and effort [Heart Sounds] : normal S1 and S2 [Abnormal Walk] : normal gait [Skin Color & Pigmentation] : normal skin color and pigmentation [FreeTextEntry1] : no evidence of bleeding or bruising

## 2021-02-01 ENCOUNTER — APPOINTMENT (OUTPATIENT)
Dept: CARDIOLOGY | Facility: CLINIC | Age: 86
End: 2021-02-01

## 2021-02-26 ENCOUNTER — APPOINTMENT (OUTPATIENT)
Dept: CARDIOLOGY | Facility: CLINIC | Age: 86
End: 2021-02-26
Payer: MEDICARE

## 2021-02-26 PROCEDURE — 85610 PROTHROMBIN TIME: CPT | Mod: QW

## 2021-02-26 NOTE — REASON FOR VISIT
[Follow-Up - Clinic] : a clinic follow-up of [Anticoagulation] : anticoagulation [Atrial Fibrillation] : atrial fibrillation [FreeTextEntry1] : Mr. Soto presents for INR check. He denies s/s of bleeding or easy bruising. He reports that he stopped warfarin a few days this week d/t a cold.

## 2021-02-26 NOTE — PHYSICAL EXAM
[Normal Appearance] : normal appearance [Well Groomed] : well groomed [General Appearance - In No Acute Distress] : no acute distress [] : no respiratory distress [Bibasilar Rales/Crackles] : bibasilar rales [Abnormal Walk] : normal gait [Affect] : the affect was normal

## 2021-03-01 RX ORDER — AMLODIPINE BESYLATE 10 MG/1
10 TABLET ORAL
Qty: 30 | Refills: 0 | Status: COMPLETED | COMMUNITY
Start: 2020-10-15

## 2021-03-08 ENCOUNTER — LABORATORY RESULT (OUTPATIENT)
Age: 86
End: 2021-03-08

## 2021-03-08 ENCOUNTER — APPOINTMENT (OUTPATIENT)
Dept: CARDIOLOGY | Facility: CLINIC | Age: 86
End: 2021-03-08
Payer: MEDICARE

## 2021-03-08 VITALS
SYSTOLIC BLOOD PRESSURE: 150 MMHG | BODY MASS INDEX: 25.8 KG/M2 | OXYGEN SATURATION: 99 % | DIASTOLIC BLOOD PRESSURE: 80 MMHG | WEIGHT: 185 LBS | HEART RATE: 51 BPM

## 2021-03-08 LAB — INR PPP: 4.3 RATIO

## 2021-03-08 PROCEDURE — 85610 PROTHROMBIN TIME: CPT | Mod: QW

## 2021-03-08 PROCEDURE — 36415 COLL VENOUS BLD VENIPUNCTURE: CPT

## 2021-03-08 PROCEDURE — 99213 OFFICE O/P EST LOW 20 MIN: CPT

## 2021-03-08 NOTE — REVIEW OF SYSTEMS
[see HPI] : see HPI [Lower Ext Edema] : lower extremity edema [Recent Weight Loss (___ Lbs)] : recent [unfilled] ~Ulb weight loss [Shortness Of Breath] : no shortness of breath [Dyspnea on exertion] : not dyspnea during exertion [Chest Pain] : no chest pain [Palpitations] : no palpitations [Cough] : no cough [Joint Pain] : no joint pain [Muscle Cramps] : no muscle cramps [Dizziness] : no dizziness [Easy Bleeding] : no tendency for easy bleeding [Easy Bruising] : no tendency for easy bruising

## 2021-03-08 NOTE — REASON FOR VISIT
[Anticoagulation] : anticoagulation [Atrial Fibrillation] : atrial fibrillation [Follow-Up - Clinic] : a clinic follow-up of [FreeTextEntry1] :  On Coumadin for atrial fibrillation Denies bleeding or bruising.  \harry Last visit INR was 1.5 and some questions regarding his dose.  Dose increased and meds discussed with his wife.\harry Gaitan reports he is taking lasix but wants to stop it next week because it makes him do to the bathroom too much.\harry Denies SOB, no CP

## 2021-03-08 NOTE — PHYSICAL EXAM
[Normal Appearance] : normal appearance [General Appearance - In No Acute Distress] : no acute distress [] : no respiratory distress [Respiration, Rhythm And Depth] : normal respiratory rhythm and effort [Heart Sounds] : normal S1 and S2 [Abnormal Walk] : normal gait [Skin Color & Pigmentation] : normal skin color and pigmentation [FreeTextEntry1] : mild memory impairment noted

## 2021-03-09 LAB
ALBUMIN SERPL ELPH-MCNC: 3.6 G/DL
ALP BLD-CCNC: 97 U/L
ALT SERPL-CCNC: 15 U/L
ANION GAP SERPL CALC-SCNC: 8 MMOL/L
AST SERPL-CCNC: 26 U/L
BASOPHILS # BLD AUTO: 0.03 K/UL
BASOPHILS NFR BLD AUTO: 1.1 %
BILIRUB SERPL-MCNC: 1.2 MG/DL
BUN SERPL-MCNC: 13 MG/DL
CALCIUM SERPL-MCNC: 9.3 MG/DL
CHLORIDE SERPL-SCNC: 109 MMOL/L
CO2 SERPL-SCNC: 24 MMOL/L
CREAT SERPL-MCNC: 1.01 MG/DL
EOSINOPHIL # BLD AUTO: 0.01 K/UL
EOSINOPHIL NFR BLD AUTO: 0.4 %
GLUCOSE SERPL-MCNC: 85 MG/DL
HCT VFR BLD CALC: 41.2 %
HGB BLD-MCNC: 12.8 G/DL
IMM GRANULOCYTES NFR BLD AUTO: 0 %
LYMPHOCYTES # BLD AUTO: 1.05 K/UL
LYMPHOCYTES NFR BLD AUTO: 37.9 %
MAN DIFF?: NORMAL
MCHC RBC-ENTMCNC: 28.8 PG
MCHC RBC-ENTMCNC: 31.1 GM/DL
MCV RBC AUTO: 92.8 FL
MONOCYTES # BLD AUTO: 0.26 K/UL
MONOCYTES NFR BLD AUTO: 9.4 %
NEUTROPHILS # BLD AUTO: 1.42 K/UL
NEUTROPHILS NFR BLD AUTO: 51.2 %
PLATELET # BLD AUTO: 102 K/UL
POTASSIUM SERPL-SCNC: 4.1 MMOL/L
PROT SERPL-MCNC: 7.5 G/DL
RBC # BLD: 4.44 M/UL
RBC # FLD: 17.5 %
SODIUM SERPL-SCNC: 142 MMOL/L
WBC # FLD AUTO: 2.77 K/UL

## 2021-03-09 RX ORDER — AMLODIPINE BESYLATE 5 MG/1
5 TABLET ORAL
Qty: 90 | Refills: 1 | Status: DISCONTINUED | COMMUNITY
Start: 2019-11-15 | End: 2021-03-09

## 2021-03-17 ENCOUNTER — APPOINTMENT (OUTPATIENT)
Dept: CARDIOLOGY | Facility: CLINIC | Age: 86
End: 2021-03-17
Payer: MEDICARE

## 2021-03-17 ENCOUNTER — NON-APPOINTMENT (OUTPATIENT)
Age: 86
End: 2021-03-17

## 2021-03-17 VITALS
BODY MASS INDEX: 25.94 KG/M2 | OXYGEN SATURATION: 98 % | DIASTOLIC BLOOD PRESSURE: 64 MMHG | HEART RATE: 52 BPM | SYSTOLIC BLOOD PRESSURE: 142 MMHG | WEIGHT: 186 LBS

## 2021-03-17 LAB — INR PPP: 4 RATIO

## 2021-03-17 PROCEDURE — 99213 OFFICE O/P EST LOW 20 MIN: CPT

## 2021-03-17 PROCEDURE — 85610 PROTHROMBIN TIME: CPT | Mod: QW

## 2021-03-17 NOTE — REVIEW OF SYSTEMS
[see HPI] : see HPI [Lower Ext Edema] : lower extremity edema [Recent Weight Gain (___ Lbs)] : no recent weight gain [Recent Weight Loss (___ Lbs)] : no recent weight loss [Shortness Of Breath] : no shortness of breath [Dyspnea on exertion] : not dyspnea during exertion [Chest Pain] : no chest pain [Palpitations] : no palpitations [Cough] : no cough [Joint Pain] : no joint pain [Muscle Cramps] : no muscle cramps [Dizziness] : no dizziness [Easy Bleeding] : no tendency for easy bleeding [Easy Bruising] : no tendency for easy bruising

## 2021-03-17 NOTE — REASON FOR VISIT
[Follow-Up - Clinic] : a clinic follow-up of [Anticoagulation] : anticoagulation [Atrial Fibrillation] : atrial fibrillation [FreeTextEntry1] :  On Coumadin for atrial fibrillation Denies bleeding or bruising.  \par Previously INR was 1.5 and some questions regarding his dose.  Dose increased and meds discussed with his wife.\par Last visit INR was elevated and wife reported she thought that he had taken extra doses. Lower extremities found to be edematous - amlodipine was stopped and losartan was increased\par \par Today denies bleeding or bruising\par Arnie reports edema is improved and open area on RLE is healing\par Denies CP/SOB/palps

## 2021-03-30 ENCOUNTER — APPOINTMENT (OUTPATIENT)
Dept: FAMILY MEDICINE | Facility: CLINIC | Age: 86
End: 2021-03-30

## 2021-04-07 ENCOUNTER — APPOINTMENT (OUTPATIENT)
Dept: CARDIOLOGY | Facility: CLINIC | Age: 86
End: 2021-04-07
Payer: MEDICARE

## 2021-04-07 VITALS
OXYGEN SATURATION: 97 % | BODY MASS INDEX: 25.11 KG/M2 | WEIGHT: 180 LBS | SYSTOLIC BLOOD PRESSURE: 156 MMHG | DIASTOLIC BLOOD PRESSURE: 70 MMHG | HEART RATE: 69 BPM

## 2021-04-07 LAB — INR PPP: 2.6 RATIO

## 2021-04-07 PROCEDURE — 99213 OFFICE O/P EST LOW 20 MIN: CPT

## 2021-04-07 PROCEDURE — 85610 PROTHROMBIN TIME: CPT | Mod: QW

## 2021-04-07 RX ORDER — FUROSEMIDE 40 MG/1
40 TABLET ORAL
Qty: 90 | Refills: 1 | Status: DISCONTINUED | COMMUNITY
End: 2021-04-07

## 2021-04-07 NOTE — REASON FOR VISIT
[Follow-Up - Clinic] : a clinic follow-up of [Anticoagulation] : anticoagulation [Atrial Fibrillation] : atrial fibrillation [FreeTextEntry1] :  On Coumadin for atrial fibrillation Denies bleeding or bruising.  \par INR has been labile lately, there was questioning regarding proper dosing. Lower extremities found to be edematous - amlodipine was stopped and losartan was increased.  Meds were verified with bottles and wife\par \par Today denies bleeding or bruising\par Arnie reports edema is improved and open area on RLE is healed\par Denies CP/SOB/palps.  He states he constantly has to urinate due to the lasix, it is effecting his ability to leave the house

## 2021-04-19 ENCOUNTER — RX RENEWAL (OUTPATIENT)
Age: 86
End: 2021-04-19

## 2021-05-10 ENCOUNTER — APPOINTMENT (OUTPATIENT)
Dept: CARDIOLOGY | Facility: CLINIC | Age: 86
End: 2021-05-10
Payer: MEDICARE

## 2021-05-10 VITALS
SYSTOLIC BLOOD PRESSURE: 144 MMHG | HEART RATE: 65 BPM | BODY MASS INDEX: 25.38 KG/M2 | OXYGEN SATURATION: 98 % | DIASTOLIC BLOOD PRESSURE: 86 MMHG | WEIGHT: 182 LBS

## 2021-05-10 LAB — INR PPP: 4.5 RATIO

## 2021-05-10 PROCEDURE — 99213 OFFICE O/P EST LOW 20 MIN: CPT

## 2021-05-10 PROCEDURE — 85610 PROTHROMBIN TIME: CPT | Mod: QW

## 2021-05-10 NOTE — PHYSICAL EXAM
[Normal Appearance] : normal appearance [General Appearance - In No Acute Distress] : no acute distress [] : no respiratory distress [Respiration, Rhythm And Depth] : normal respiratory rhythm and effort [Heart Sounds] : normal S1 and S2 [Abnormal Walk] : normal gait [Skin Color & Pigmentation] : normal skin color and pigmentation [Well Developed] : well developed [No Acute Distress] : no acute distress [Normal S1, S2] : normal S1, S2 [Clear Lung Fields] : clear lung fields [No Respiratory Distress] : no respiratory distress  [Moves all extremities] : moves all extremities [No Focal Deficits] : no focal deficits [de-identified] : irreg [de-identified] : slightly unsteady gait [de-identified] : 1+ non pitting edema LEs [de-identified] : no evidence of bleeding or bruising [de-identified] : mild memory impairment [FreeTextEntry1] : mild memory impairment noted

## 2021-05-10 NOTE — REVIEW OF SYSTEMS
[Weight Gain (___ Lbs)] : no recent weight gain [Weight Loss (___ Lbs)] : no recent weight loss [SOB] : no shortness of breath [Dyspnea on exertion] : not dyspnea during exertion [Leg Claudication] : no intermittent leg claudication [Palpitations] : no palpitations [Syncope] : no syncope [Cough] : no cough [Joint Pain] : no joint pain [Myalgia] : no myalgia [Dizziness] : no dizziness [Rash] : no rash [Easy Bleeding] : no tendency for easy bleeding [Easy Bruising] : no tendency for easy bruising

## 2021-05-10 NOTE — REASON FOR VISIT
[Arrhythmia/ECG Abnorrmalities] : arrhythmia/ECG abnormalities [Follow-Up - Clinic] : a clinic follow-up of [Anticoagulation] : anticoagulation [Atrial Fibrillation] : atrial fibrillation [FreeTextEntry1] :  On Coumadin for atrial fibrillation Denies bleeding or bruising.  \par INR has been labile lately, there was questioning regarding proper dosing. Lower extremities found to be edematous - amlodipine was stopped and losartan was increased.  Meds were verified with bottles and wife 1 month ago\par \par Today denies bleeding or bruising\par Arnie reports edema is improved and open area on RLE is healed\par Last visit lasix stopped and HCTZ was started for hypertension and c/o too much urination on lasix\par \par

## 2021-05-11 LAB
ALBUMIN SERPL ELPH-MCNC: 3.7 G/DL
ALP BLD-CCNC: 106 U/L
ALT SERPL-CCNC: 15 U/L
ANION GAP SERPL CALC-SCNC: 10 MMOL/L
AST SERPL-CCNC: 26 U/L
BILIRUB SERPL-MCNC: 1.5 MG/DL
BUN SERPL-MCNC: 12 MG/DL
CALCIUM SERPL-MCNC: 9.3 MG/DL
CHLORIDE SERPL-SCNC: 108 MMOL/L
CO2 SERPL-SCNC: 25 MMOL/L
CREAT SERPL-MCNC: 0.93 MG/DL
GLUCOSE SERPL-MCNC: 66 MG/DL
POTASSIUM SERPL-SCNC: 4.1 MMOL/L
PROT SERPL-MCNC: 7.6 G/DL
SODIUM SERPL-SCNC: 142 MMOL/L

## 2021-06-01 ENCOUNTER — NON-APPOINTMENT (OUTPATIENT)
Age: 86
End: 2021-06-01

## 2021-06-01 ENCOUNTER — APPOINTMENT (OUTPATIENT)
Dept: CARDIOLOGY | Facility: CLINIC | Age: 86
End: 2021-06-01
Payer: MEDICARE

## 2021-06-01 VITALS
SYSTOLIC BLOOD PRESSURE: 140 MMHG | WEIGHT: 175 LBS | BODY MASS INDEX: 24.5 KG/M2 | DIASTOLIC BLOOD PRESSURE: 78 MMHG | TEMPERATURE: 98.6 F | HEIGHT: 71 IN | HEART RATE: 58 BPM

## 2021-06-01 PROCEDURE — 93000 ELECTROCARDIOGRAM COMPLETE: CPT

## 2021-06-01 PROCEDURE — 99214 OFFICE O/P EST MOD 30 MIN: CPT

## 2021-06-01 NOTE — REASON FOR VISIT
[FreeTextEntry1] : The patient is followed with a principal diagnosis of chronic atrial fibrillation, hypertension, and valvular heart disease detected on echocardiography.

## 2021-06-01 NOTE — HISTORY OF PRESENT ILLNESS
[FreeTextEntry1] : The patient has had no acute cardiac symptoms. There has been no significant shortness of breath palpitations or chest pain. There has been gradual and significant weight loss over the past year presumably because of a decreased appetite.

## 2021-06-01 NOTE — DISCUSSION/SUMMARY
[FreeTextEntry1] : The patient's clinical condition appears to be stable. There have been no acute cardiac symptoms and his wife confirms this. The exercise is limited. There is no PND or orthopnea. There is nocturia.today his blood pressure is 140/78 the exam revealsthe PMI to be sustained in the anterior axillary line there is a grade 2 high pitched blowing systolic murmur at the apex radiating to the axilla the electrocardiograph reveals atrial fibrillation with controlled ventricular rate. The patient has been on long-term Coumadin anticoagulation which will be continued. This has been followed closely at our office and the patient is used to this arrangement.\par the patient's echocardiogram done about a year ago reveals concentric left ventricular hypertrophy dilated RV dilated LA and RA moderate MR moderate TR and aortic valve calcification with mild AR.\par There is no evidence of CHF or fluid retention\par \par I have advised the patient to be seen by a primary care physician which he has not done in some time. \par I plan to do a repeat echocardiogram at the patient's next visit.\par I plan to continue the patient's current medications.

## 2021-06-01 NOTE — PHYSICAL EXAM
[Well Developed] : well developed [Well Nourished] : well nourished [No Acute Distress] : no acute distress [Normal Conjunctiva] : normal conjunctiva [Normal Venous Pressure] : normal venous pressure [No Carotid Bruit] : no carotid bruit [Normal S1, S2] : normal S1, S2 [No Rub] : no rub [No Gallop] : no gallop [Clear Lung Fields] : clear lung fields [Good Air Entry] : good air entry [No Respiratory Distress] : no respiratory distress  [Soft] : abdomen soft [Non Tender] : non-tender [No Masses/organomegaly] : no masses/organomegaly [Normal Bowel Sounds] : normal bowel sounds [Normal Gait] : normal gait [No Edema] : no edema [No Cyanosis] : no cyanosis [No Clubbing] : no clubbing [No Varicosities] : no varicosities [No Rash] : no rash [No Skin Lesions] : no skin lesions [Moves all extremities] : moves all extremities [No Focal Deficits] : no focal deficits [Normal Speech] : normal speech [Alert and Oriented] : alert and oriented [Normal memory] : normal memory [de-identified] : The PMI is displaced to the anterior axillary line with a sustained upstroke. There is a grade 2 high-pitched systolic murmur at the apex radiating to the axilla.

## 2021-06-21 NOTE — PHYSICAL EXAM
Social Work   Incoming Voicemail  [facility]    Incoming Voicemail From:  Solo Huang    Content of Voicemail:    Pt recalls seeing Dr. Jauregui several weeks ago and mentioning writer. He remembers setting appt with me but cancelling. He is supposed to get a battery of tests. He is hoping to reschedule. He is willing to try again.    Response/Plan:    Writer returned call. Solo reports that he would like to meet with writer. He is having a PET scan this week, then meets with oncologist July 1 and may be starting chemotherapy treatments soon. He has been living with Stage IV colon cancer for 7 years. Writer reviewed plan for diagnostic assessment, including MINI psychiatric questionnaire. Writer reviewed she is not a psychologist and if Solo is interested in other testing, he may need to look at other options. Solo would like to connect to on-going therapist and has been having difficulty with this. Plan is for writer to call Solo on 7/6 (after appts and writer returns from vacation) to check in and identify appropriateness of diagnostic assessment.      Will route to patient's current psychiatric provider(s) as an FYI.   Please call or EPIC message with any questions or concerns.    Loreta Sibley, MSW, LICSW           [Normal Appearance] : normal appearance [Auscultation Breath Sounds / Voice Sounds] : lungs were clear to auscultation bilaterally [General Appearance - In No Acute Distress] : no acute distress [Heart Sounds] : normal S1 and S2 [Abnormal Walk] : normal gait [Abdomen Soft] : soft [FreeTextEntry1] : Evidence of bleeding or bruising

## 2021-07-06 ENCOUNTER — APPOINTMENT (OUTPATIENT)
Dept: CARDIOLOGY | Facility: CLINIC | Age: 86
End: 2021-07-06
Payer: MEDICARE

## 2021-07-06 VITALS
DIASTOLIC BLOOD PRESSURE: 80 MMHG | WEIGHT: 178 LBS | BODY MASS INDEX: 24.92 KG/M2 | SYSTOLIC BLOOD PRESSURE: 126 MMHG | HEIGHT: 71 IN | TEMPERATURE: 98.6 F | HEART RATE: 71 BPM

## 2021-07-06 DIAGNOSIS — Q23.1 CONGENITAL INSUFFICIENCY OF AORTIC VALVE: ICD-10-CM

## 2021-07-06 LAB — INR PPP: 3.4 RATIO

## 2021-07-06 PROCEDURE — 99214 OFFICE O/P EST MOD 30 MIN: CPT | Mod: 25

## 2021-07-06 PROCEDURE — 93306 TTE W/DOPPLER COMPLETE: CPT

## 2021-07-06 NOTE — DISCUSSION/SUMMARY
[FreeTextEntry1] : today's examination reveals no evidence of fluid retention, in fact there has been improvement in leg edema. The patient has had no acute cardiac symptoms.the exam reveals clear lung fields there is a grade 2 systolic murmur at the lower left sternal border the legs revealedimprovement in lower extremity edema. The echocardiogram which I just performed reveals normal LV function, severely dilated LA and RA, moderate mitral regurgitation mild aortic regurgitation. These findings are similar to the previous echo.\par he patient has been lacking a primary care physician. I have recommended that he see a primaryand I'm recommending Dr. Alessio Still.\par the patient now appears to be willing to switch over to Eliquis instead of warfarin and I'm going to have him back on Friday to make sure that his INR is below 2 at which time we will begin the Eliquis.

## 2021-07-06 NOTE — PHYSICAL EXAM
[Well Developed] : well developed [Well Nourished] : well nourished [No Acute Distress] : no acute distress [Normal Conjunctiva] : normal conjunctiva [Normal Venous Pressure] : normal venous pressure [No Carotid Bruit] : no carotid bruit [Normal S1, S2] : normal S1, S2 [No Rub] : no rub [No Gallop] : no gallop [Clear Lung Fields] : clear lung fields [Good Air Entry] : good air entry [No Respiratory Distress] : no respiratory distress  [Soft] : abdomen soft [Non Tender] : non-tender [No Masses/organomegaly] : no masses/organomegaly [Normal Bowel Sounds] : normal bowel sounds [Normal Gait] : normal gait [No Edema] : no edema [No Cyanosis] : no cyanosis [No Clubbing] : no clubbing [No Varicosities] : no varicosities [No Rash] : no rash [No Skin Lesions] : no skin lesions [Moves all extremities] : moves all extremities [No Focal Deficits] : no focal deficits [Normal Speech] : normal speech [Alert and Oriented] : alert and oriented [Normal memory] : normal memory [de-identified] : grade 2 systolic murmur lower left sternal border and apex.

## 2021-07-06 NOTE — REASON FOR VISIT
[FreeTextEntry1] : Patient is followed with principal diagnoses of chronic atrial fibrillation, valvular heart disease. He returns today for followup including echocardiography.

## 2021-07-06 NOTE — HISTORY OF PRESENT ILLNESS
[FreeTextEntry1] : He shouldn't denies acute cardiac symptoms. He has had no symptoms of acute chest pain shortness of breath dizziness lightheadedness or palpitations. Lower extremity edema has improved since the last examination. Patient has had some weight loss.there is no paroxysmal nocturnal dyspnea orthopnea or fluid retention.

## 2021-07-12 ENCOUNTER — APPOINTMENT (OUTPATIENT)
Dept: CARDIOLOGY | Facility: CLINIC | Age: 86
End: 2021-07-12
Payer: MEDICARE

## 2021-07-12 LAB — INR PPP: 1.9 RATIO

## 2021-07-12 PROCEDURE — 85610 PROTHROMBIN TIME: CPT | Mod: QW

## 2021-07-12 PROCEDURE — 99212 OFFICE O/P EST SF 10 MIN: CPT

## 2021-07-13 NOTE — REASON FOR VISIT
[FreeTextEntry1] : The patient comes to the office today in order to transition from warfarin to apixaban 5 mg b.i.d. We have previously described in great detail the benefits and risks of the switch over to the new medication. I have recommended this transition in order to avoid swings and the INR on warfarin. Today's INR determination was 1.9. I recommended that the patient begin apixaban tomorrow at a dosage of 5 mg b.i.d.
2-3 weeks

## 2021-08-04 ENCOUNTER — APPOINTMENT (OUTPATIENT)
Dept: CARDIOLOGY | Facility: CLINIC | Age: 86
End: 2021-08-04
Payer: MEDICARE

## 2021-08-04 VITALS
SYSTOLIC BLOOD PRESSURE: 144 MMHG | HEART RATE: 60 BPM | DIASTOLIC BLOOD PRESSURE: 60 MMHG | BODY MASS INDEX: 24.83 KG/M2 | WEIGHT: 178 LBS | OXYGEN SATURATION: 97 %

## 2021-08-04 PROCEDURE — 99213 OFFICE O/P EST LOW 20 MIN: CPT

## 2021-08-04 RX ORDER — LOSARTAN POTASSIUM 25 MG/1
25 TABLET, FILM COATED ORAL
Qty: 90 | Refills: 0 | Status: DISCONTINUED | COMMUNITY
Start: 2020-08-07 | End: 2021-08-04

## 2021-08-04 RX ORDER — WARFARIN 5 MG/1
5 TABLET ORAL DAILY
Qty: 135 | Refills: 3 | Status: DISCONTINUED | COMMUNITY
Start: 2020-10-15 | End: 2021-08-04

## 2021-08-04 NOTE — REASON FOR VISIT
[Arrhythmia/ECG Abnorrmalities] : arrhythmia/ECG abnormalities [FreeTextEntry1] :  He has now switched to Eliquis- stopped warfarin.   Denies bleeding or bruising\par \par Edema is improved off amlodipine\par Overall feels well\par Denies CP/SOB/palps [Follow-Up - Clinic] : a clinic follow-up of [Anticoagulation] : anticoagulation [Atrial Fibrillation] : atrial fibrillation

## 2021-08-04 NOTE — REVIEW OF SYSTEMS
[Weight Gain (___ Lbs)] : no recent weight gain [Weight Loss (___ Lbs)] : no recent weight loss [SOB] : no shortness of breath [Dyspnea on exertion] : not dyspnea during exertion [Leg Claudication] : no intermittent leg claudication [Palpitations] : no palpitations [Syncope] : no syncope [Cough] : no cough [Joint Pain] : no joint pain [Myalgia] : no myalgia [Rash] : no rash [Dizziness] : no dizziness [Easy Bleeding] : no tendency for easy bleeding [Easy Bruising] : no tendency for easy bruising

## 2021-08-04 NOTE — PHYSICAL EXAM
[Well Developed] : well developed [No Acute Distress] : no acute distress [Normal S1, S2] : normal S1, S2 [Clear Lung Fields] : clear lung fields [No Respiratory Distress] : no respiratory distress  [Moves all extremities] : moves all extremities [No Focal Deficits] : no focal deficits [de-identified] : irreg [de-identified] : slightly unsteady gait [de-identified] : 1+ non pitting edema LEs [de-identified] : no evidence of bleeding or bruising [de-identified] : mild memory impairment

## 2021-10-18 ENCOUNTER — APPOINTMENT (OUTPATIENT)
Dept: CARDIOLOGY | Facility: CLINIC | Age: 86
End: 2021-10-18
Payer: MEDICARE

## 2021-10-18 ENCOUNTER — NON-APPOINTMENT (OUTPATIENT)
Age: 86
End: 2021-10-18

## 2021-10-18 VITALS
DIASTOLIC BLOOD PRESSURE: 66 MMHG | HEIGHT: 71 IN | WEIGHT: 177 LBS | SYSTOLIC BLOOD PRESSURE: 140 MMHG | BODY MASS INDEX: 24.78 KG/M2

## 2021-10-18 DIAGNOSIS — Z86.79 PERSONAL HISTORY OF OTHER DISEASES OF THE CIRCULATORY SYSTEM: ICD-10-CM

## 2021-10-18 PROCEDURE — 99213 OFFICE O/P EST LOW 20 MIN: CPT

## 2021-10-18 PROCEDURE — 93000 ELECTROCARDIOGRAM COMPLETE: CPT

## 2021-10-18 NOTE — PHYSICAL EXAM
[Well Developed] : well developed [Well Nourished] : well nourished [No Acute Distress] : no acute distress [Normal Conjunctiva] : normal conjunctiva [Normal Venous Pressure] : normal venous pressure [No Carotid Bruit] : no carotid bruit [Normal S1, S2] : normal S1, S2 [No Rub] : no rub [No Gallop] : no gallop [Clear Lung Fields] : clear lung fields [Good Air Entry] : good air entry [No Respiratory Distress] : no respiratory distress  [Soft] : abdomen soft [Non Tender] : non-tender [No Masses/organomegaly] : no masses/organomegaly [Normal Bowel Sounds] : normal bowel sounds [Normal Gait] : normal gait [No Edema] : no edema [No Cyanosis] : no cyanosis [No Clubbing] : no clubbing [No Varicosities] : no varicosities [No Rash] : no rash [No Skin Lesions] : no skin lesions [Moves all extremities] : moves all extremities [No Focal Deficits] : no focal deficits [Normal Speech] : normal speech [Alert and Oriented] : alert and oriented [Normal memory] : normal memory [de-identified] : grade 2 systolic murmur aortic area. Grade 2-3 high-pitched systolic murmur at apex.

## 2021-10-18 NOTE — REASON FOR VISIT
[FreeTextEntry1] : Patient returns for followup. Is followed with the principle diagnoses of chronic atrial fibrillation as well as valvular heart disease. The patient states that he is doing well. He denies symptoms of chest pain shortness of breath or palpitations. Lower extremity edema has improved on the current medications. He was recently switched from warfarin to ELIQUIS and is doing well on the new medication.

## 2021-10-18 NOTE — CARDIOLOGY SUMMARY
[de-identified] : Echocardiogram July 6, 2021 normal LV dimensions and contraction pattern estimated EF 65% normal RV contraction pattern severely dilated left atrium severely dilated right atrium moderate mitral regurg moderate to severe tricuspid regurgitation PA pressure 35 mm of mercury thickened aortic valve leaflets mild to moderate aortic regurg.

## 2021-10-18 NOTE — REVIEW OF SYSTEMS
[Urinary Frequency] : urinary frequency [Negative] : Heme/Lymph [FreeTextEntry8] : Patient reports frequent urination initially at night.

## 2021-10-18 NOTE — DISCUSSION/SUMMARY
[FreeTextEntry1] : The patient's clinical condition remained stable. There have been no cardiac symptoms. Lower extremity edema has improved on the current regimen. The patient has no evidence of CHF. There is no evidence of shortness of breath thought may or paroxysmal nocturnal dyspnea. He does complain of nocturia. I have been advising the patient for some time to check in with her primary care physician. I suspect that the may be dealing with prostatism. The patient promises me he will check in with a primary care physician.There has been weight loss over the past year of uncertain etiology.there has been no significant weight change over the past several visits. Based on today's examination I find the patient's cardiovascular status to be stable. The current medications will be continued. V-Y Plasty Text: The defect edges were debeveled with a #15 scalpel blade.  Given the location of the defect, shape of the defect and the proximity to free margins an V-Y advancement flap was deemed most appropriate.  Using a sterile surgical marker, an appropriate advancement flap was drawn incorporating the defect and placing the expected incisions within the relaxed skin tension lines where possible.    The area thus outlined was incised deep to adipose tissue with a #15 scalpel blade.  The skin margins were undermined to an appropriate distance in all directions utilizing iris scissors.

## 2021-11-29 ENCOUNTER — RX RENEWAL (OUTPATIENT)
Age: 86
End: 2021-11-29

## 2021-12-06 ENCOUNTER — RX RENEWAL (OUTPATIENT)
Age: 86
End: 2021-12-06

## 2022-01-25 ENCOUNTER — APPOINTMENT (OUTPATIENT)
Dept: CARDIOLOGY | Facility: CLINIC | Age: 87
End: 2022-01-25

## 2022-01-27 ENCOUNTER — NON-APPOINTMENT (OUTPATIENT)
Age: 87
End: 2022-01-27

## 2022-02-02 ENCOUNTER — APPOINTMENT (OUTPATIENT)
Dept: FAMILY MEDICINE | Facility: CLINIC | Age: 87
End: 2022-02-02
Payer: MEDICARE

## 2022-02-02 ENCOUNTER — LABORATORY RESULT (OUTPATIENT)
Age: 87
End: 2022-02-02

## 2022-02-02 VITALS
SYSTOLIC BLOOD PRESSURE: 150 MMHG | OXYGEN SATURATION: 98 % | BODY MASS INDEX: 23.8 KG/M2 | DIASTOLIC BLOOD PRESSURE: 70 MMHG | TEMPERATURE: 97.6 F | HEIGHT: 71 IN | HEART RATE: 85 BPM | WEIGHT: 170 LBS

## 2022-02-02 DIAGNOSIS — F09 UNSPECIFIED MENTAL DISORDER DUE TO KNOWN PHYSIOLOGICAL CONDITION: ICD-10-CM

## 2022-02-02 DIAGNOSIS — R35.0 FREQUENCY OF MICTURITION: ICD-10-CM

## 2022-02-02 DIAGNOSIS — R63.4 ABNORMAL WEIGHT LOSS: ICD-10-CM

## 2022-02-02 DIAGNOSIS — Z87.898 PERSONAL HISTORY OF OTHER SPECIFIED CONDITIONS: ICD-10-CM

## 2022-02-02 PROCEDURE — 99214 OFFICE O/P EST MOD 30 MIN: CPT | Mod: 25

## 2022-02-02 PROCEDURE — 36415 COLL VENOUS BLD VENIPUNCTURE: CPT

## 2022-02-02 RX ORDER — BISACODYL 5 MG/1
5 TABLET ORAL
Refills: 0 | Status: ACTIVE | COMMUNITY

## 2022-02-02 RX ORDER — TRAVOPROST 0.04 MG/ML
0 SOLUTION/ DROPS OPHTHALMIC
Refills: 0 | Status: ACTIVE | COMMUNITY

## 2022-02-02 RX ORDER — TIMOLOL MALEATE 2.5 MG/ML
0.25 SOLUTION OPHTHALMIC
Refills: 0 | Status: ACTIVE | COMMUNITY

## 2022-02-02 RX ORDER — BRINZOLAMIDE 10 MG/ML
1 SUSPENSION/ DROPS OPHTHALMIC
Refills: 0 | Status: ACTIVE | COMMUNITY

## 2022-02-02 NOTE — HISTORY OF PRESENT ILLNESS
[de-identified] : 86-year-old male with a history of allergic rhinitis, atrial fibrillation on Eliquis, hyperlipidemia, hypertension, presents today as a new patient to this provider to establish care and due to recent hospitalization\par 1/1/2022- 1/8/2022 -patient was hospitalized at [facility].  Initially this admission was due to significant back pain.\par -Pain management evaluated the patient and due to a bacteremia of strep group D with the source being pneumonia the patient was advised to follow-up once the infection resolved with pain management\par -The patient notes at this time the pain is manageable he is doing well able to ambulate at this time\par \par Pneumonia strep group D\par -The patient was recommended to have further evaluation by GI.  He was treated with antibiotics.  He had a KEKE which was negative for vegetations\par -The patient is going to need repeat imaging including liver ultrasound and spine imaging with contrast\par \par Iron deficiency anemia was noted\par -He was transfused.\par -FOBT in the hospital was negative\par -He was recommended to see GI for possible endoscopy\par -At [facility] the hemoglobin decreased.  He was sent to the emergency room January 20 for an infusion of RBC.  Hemoglobin on 1/25 was 8\par -He is currently on Eliquis\par \par History of pulmonary hypertension.  He was also started on Seroquel as needed for agitation delirium.  He has not experienced any delirium since discharge from [facility]\par \par Prostate\par -Patient has a history of urinary frequency hesitancy and incomplete emptying.  He was advised in the past to see urology but has not done so\par -He continues to have the symptoms\par \par Patient notes he has a cough

## 2022-02-02 NOTE — PHYSICAL EXAM
[No Edema] : there was no peripheral edema [Stool Occult Blood] : stool positive for occult blood [Normal] : no CVA or spinal tenderness [FreeTextEntry1] : Good sphincter tone, no palpable masses, enlarged prostate

## 2022-02-03 ENCOUNTER — RESULT REVIEW (OUTPATIENT)
Age: 87
End: 2022-02-03

## 2022-02-03 ENCOUNTER — APPOINTMENT (OUTPATIENT)
Dept: GASTROENTEROLOGY | Facility: HOSPITAL | Age: 87
End: 2022-02-03

## 2022-02-03 ENCOUNTER — NON-APPOINTMENT (OUTPATIENT)
Age: 87
End: 2022-02-03

## 2022-02-03 LAB
ALBUMIN SERPL ELPH-MCNC: 3 G/DL
ALP BLD-CCNC: 109 U/L
ALT SERPL-CCNC: 24 U/L
ANION GAP SERPL CALC-SCNC: 8 MMOL/L
AST SERPL-CCNC: 28 U/L
BASOPHILS # BLD AUTO: 0.03 K/UL
BASOPHILS NFR BLD AUTO: 0.8 %
BILIRUB SERPL-MCNC: 0.7 MG/DL
BUN SERPL-MCNC: 13 MG/DL
CALCIUM SERPL-MCNC: 8.8 MG/DL
CHLORIDE SERPL-SCNC: 113 MMOL/L
CO2 SERPL-SCNC: 24 MMOL/L
CREAT SERPL-MCNC: 0.68 MG/DL
EOSINOPHIL # BLD AUTO: 0.05 K/UL
EOSINOPHIL NFR BLD AUTO: 1.4 %
FERRITIN SERPL-MCNC: 31 NG/ML
GLUCOSE SERPL-MCNC: 82 MG/DL
HCT VFR BLD CALC: 26.6 %
HGB BLD-MCNC: 7.6 G/DL
IMM GRANULOCYTES NFR BLD AUTO: 0.3 %
IRON SATN MFR SERPL: 5 %
IRON SERPL-MCNC: 16 UG/DL
LYMPHOCYTES # BLD AUTO: 1.07 K/UL
LYMPHOCYTES NFR BLD AUTO: 29.5 %
MAN DIFF?: NORMAL
MCHC RBC-ENTMCNC: 24.1 PG
MCHC RBC-ENTMCNC: 28.6 GM/DL
MCV RBC AUTO: 84.4 FL
MONOCYTES # BLD AUTO: 0.39 K/UL
MONOCYTES NFR BLD AUTO: 10.7 %
NEUTROPHILS # BLD AUTO: 2.08 K/UL
NEUTROPHILS NFR BLD AUTO: 57.3 %
PLATELET # BLD AUTO: 161 K/UL
POTASSIUM SERPL-SCNC: 4.2 MMOL/L
PROT SERPL-MCNC: 7 G/DL
PSA FREE FLD-MCNC: 22 %
PSA FREE SERPL-MCNC: 6.7 NG/ML
PSA SERPL-MCNC: 29.9 NG/ML
RBC # BLD: 3.15 M/UL
RBC # BLD: 3.15 M/UL
RBC # FLD: 23.3 %
RETICS # AUTO: 1.5 %
RETICS AGGREG/RBC NFR: 48 K/UL
SODIUM SERPL-SCNC: 145 MMOL/L
TIBC SERPL-MCNC: 304 UG/DL
UIBC SERPL-MCNC: 287 UG/DL
WBC # FLD AUTO: 3.63 K/UL

## 2022-02-04 ENCOUNTER — NON-APPOINTMENT (OUTPATIENT)
Age: 87
End: 2022-02-04

## 2022-02-04 ENCOUNTER — TRANSCRIPTION ENCOUNTER (OUTPATIENT)
Age: 87
End: 2022-02-04

## 2022-02-05 ENCOUNTER — TRANSCRIPTION ENCOUNTER (OUTPATIENT)
Age: 87
End: 2022-02-05

## 2022-02-09 ENCOUNTER — NON-APPOINTMENT (OUTPATIENT)
Age: 87
End: 2022-02-09

## 2022-02-09 ENCOUNTER — APPOINTMENT (OUTPATIENT)
Dept: PAIN MANAGEMENT | Facility: CLINIC | Age: 87
End: 2022-02-09
Payer: MEDICARE

## 2022-02-09 VITALS
HEIGHT: 71 IN | TEMPERATURE: 98 F | BODY MASS INDEX: 23.8 KG/M2 | SYSTOLIC BLOOD PRESSURE: 140 MMHG | WEIGHT: 170 LBS | DIASTOLIC BLOOD PRESSURE: 70 MMHG

## 2022-02-09 DIAGNOSIS — M79.10 MYALGIA, UNSPECIFIED SITE: ICD-10-CM

## 2022-02-09 DIAGNOSIS — M54.16 RADICULOPATHY, LUMBAR REGION: ICD-10-CM

## 2022-02-09 DIAGNOSIS — M47.817 SPONDYLOSIS W/OUT MYELOPATHY OR RADICULOPATHY, LUMBOSACRAL REGION: ICD-10-CM

## 2022-02-09 PROCEDURE — 99214 OFFICE O/P EST MOD 30 MIN: CPT

## 2022-02-09 NOTE — ASSESSMENT
[FreeTextEntry1] : 86 yom w/ low back pain recently discharged from hospital and doing well.\par \par I have personally reviewed the patient's MRI in detail and discussed it with them which is significant for moderate lumbar stenosis. There is also an abnormal lesion at T6.\par \par Patient to see Dr. Bañuelos regarding T6 lesion and anemia on 02/22/22.\par \par Physical therapy prescribed - goal will be to increase ROM, strengthening, postural training, other modalities ad cristina which may include massage and stim. Goals of therapy discussed with the patient in detail and will be discussed with physical therapist. Patient will follow-up following course of physical therapy to monitor progress and adjust therapy as needed.\par \par Acetaminophen 1,000 mg q8h prn for moderate pain. Risks, benefits, and alternatives of acetaminophen discussed with patient.\par \par Diet and nutritional strategies discussed which may improve patients pain and will improve overall health.\par

## 2022-02-09 NOTE — HISTORY OF PRESENT ILLNESS
[5] : 3. What number best describes how, during the past week, pain has interfered with your general activity? 5/10 pain [Back Pain] : back pain [___ mths] : [unfilled] month(s) ago [Constant] : constant [6] : a minimum pain level of 6/10 [9] : a maximum pain level of 9/10 [Sharp] : sharp [Throbbing] : throbbing [Sitting] : sitting [Transitioning] : transitioning [Rest] : rest [FreeTextEntry1] : 86 yom with PMHx dementia, HLD, HTN, glaucoma, and AFib on anticoagulant first seen by me in hospital on 01/03/22 after being admitted for severe back pain. Back pain is localized to lower lumbar spine and sharp pain radiates to hip. After his admission for back pain he was found to be anemic. He was just discharged from the hospital yesterday, 02/08/22. He reports that he no longer has back pain. He is able to put his cloths on without difficulty. His spirits are high and he is doing well. \par  [FreeTextEntry2] : 15 [FreeTextEntry7] : Referred by Dr. Still. Back pain

## 2022-02-09 NOTE — DATA REVIEWED
[FreeTextEntry1] : Exam Date: 	  01/03/22					\par Exam: 	MRI LUMBAR SPINE					\par Order#:	[id number]					\par             \par \par \par I, Doris Barbosa MD, have reviewed the images and the report and agree with the findings with the additional modification: Mild dextroscoliosis of the low lumbar spine. Minimal retrolisthesis of L3 on L4 and minimal to mild retrolisthesis of L4 on L5 (approximately 2 mm). There is a hemangioma within the L5 vertebral body. There is a hemangioma versus Schmorl's node seen at the superior endplate of L4.\par \par There is multilevel degenerative changes with mild to moderate loss of disc height and T2 signal osteophyte formation from L2/L3 through L5/S1.\par \par At the L5/S1 level there is a diffuse disc bulge with a small central disc protrusion and right far lateral disc protrusion flattening the ventral thecal sac compressing the right and contacting the left descending S1 nerve roots as well as the contacting the bilateral far lateral L5 exiting nerve roots. There is severe right and moderate left foraminal narrowing. There is severe right and moderate to severe left foraminal narrowing. There is severe right and moderate to severe left lateral recess narrowing. There is severe facet and ligamentous hypertrophy. The constellation of findings is causing mild to moderate spinal canal stenosis.\par \par At the L4/L5 level there is a diffuse disc bulge flattening the ventral thecal sac and mildly compressing the right descending L4 nerve root. There is moderate to severe facet and ligamentous hypertrophy. There is moderate to severe bilateral foraminal narrowing. The bilateral L4 foraminal exiting nerve roots are likely within normal limits. There is moderate facet and ligamentous hypertrophy. There is right mild lateral recess narrowing. The constellation of findings is causing mild spinal canal stenosis.\par \par At the L3/L4 level there is a disc osteophyte complex flattening the ventral thecal sac and compressing the bilateral distal foraminal L3 exiting nerve roots as well as the bilateral descending L4 nerve roots. There is mild to moderate facet ligamentous hypertrophy. The constellation of findings is causing mild to moderate spinal canal stenosis.\par \par [facility] PRELIMINARY REPORT\par \par PROCEDURE INFORMATION: \par Exam: MR Lumbar Spine Without Contrast \par Exam date and time: 1/3/2022 3:34 PM \par Age: 86 years old \par Clinical indication: Low back pain \par \par TECHNIQUE: \par Imaging protocol: Multiplanar magnetic resonance images of the lumbar spine \par without intravenous contrast. \par \par COMPARISON: \par CT LUMBAR SPINE 1/1/2022 7:05 PM \par \par FINDINGS: \par Vertebrae:  A Schmorl&apos;s node is again seen involving the superior endplate of \par L4 with a hemangioma involving the posterior body of L5.  Vertebral body height \par is grossly preserved throughout lumbar levels and no acute fractures or \par dislocations are identified. No other marrow replacing lesions are detected at \par lumbar levels. \par Spinal cord:  The distal cord and conus are normal in configuration and signal \par characteristics. \par T12-L1:  There is no significant disc bulge or protrusion. No central canal or \par foraminal stenosis detected. \par L1-L2:  There is no significant disc bulge or protrusion. No central canal or \par foraminal stenosis detected. \par L2-L3:  There is mild retrolisthesis of L2 upon L3 and uncovering and \par broad-based posterior disc bulging produces canal stenosis, AP canal dimension \par 8-9 mm.  Foraminal disc bulges result in bilateral foraminal distortions at \par L2-L3.  \par L3-L4:  Broad-based posterior osteocartilaginous ridging indents the ventral \par thecal sac resulting in moderate canal stenosis (AP canal dimension \par approximately 7 mm) and bilateral effacement of the subarticular recesses.  \par There are mild bilateral foraminal distortions at L3-L4.  \par L4-L5:  Mild broad-based posterior disc bulging results in mild canal stenosis, \par AP canal dimension 8-9 mm.  Neural foramina appear adequate at L4-L5.  \par L5-S1:  Broad-based posterior osteocartilaginous ridging and changes of facet \par arthropathy produce bilateral effacement of the subarticular recesses with mass \par effect upon traversing right and left S1 roots.  There is mild narrowing of the \par right neural foramen at L5-S1 with the left neural foramen adequate at this \par level.  \par Soft tissues:  A 9.5 cm cyst along the posterior margin of the left upper \par quadrant is of renal origin and this large cyst as well as multiple additional \par smaller bilateral renal cysts require no further follow-up. \par \par \par IMPRESSION: \par 1. Lumbar spondylosis with broad-based posterior osteocartilaginous ridging \par producing moderate central canal stenosis at L3-L4 with milder central canal \par stenosis seen at L2-L3 and L4-L5.  \par 2.  Broad-based posterior osteocartilaginous ridging and facet changes at L5-S1 \par produce bilateral effacement of the subarticular recesses with mass effect upon \par the traversing right and left S1 roots.\par \par 01/01/22					\par CT LUMBAR SPINE					\par \par \par Mild lumbar dextroscoliosis is present. The bones are diffusely osteopenic. No acute fracture is identified. An L5 vertebral body hemangioma is incidentally noted. Minimal retrolisthesis is seen at C4-5. The vertebral body heights are preserved. Mild to moderate multilevel degenerative disc disease is present, greatest at L3-4. A prominent L4 superior endplate Schmorl's node is visualized. There is severe facet arthropathy on the right at L5-S1. Mild facet arthropathy is seen at the other levels. The paraspinal soft tissues are within normal limits. \par \par There is mild to moderate canal stenosis at L4-5 and L5-S1 due to disc bulge and ligamentous hypertrophy. Mild multilevel neural foraminal stenosis due to predominantly uncovertebral spurring is noted.\par \par Multiple large left renal cysts are partially imaged. Moderate aortoiliac calcifications are seen.\par \par \par IMPRESSION:\par \par No acute osseous abnormality of the cervical spine. Multilevel spondylosis as above.

## 2022-02-09 NOTE — PHYSICAL EXAM

## 2022-02-14 ENCOUNTER — APPOINTMENT (OUTPATIENT)
Dept: FAMILY MEDICINE | Facility: CLINIC | Age: 87
End: 2022-02-14
Payer: MEDICARE

## 2022-02-14 ENCOUNTER — LABORATORY RESULT (OUTPATIENT)
Age: 87
End: 2022-02-14

## 2022-02-14 VITALS
HEART RATE: 84 BPM | SYSTOLIC BLOOD PRESSURE: 130 MMHG | TEMPERATURE: 98.6 F | WEIGHT: 170 LBS | OXYGEN SATURATION: 98 % | DIASTOLIC BLOOD PRESSURE: 70 MMHG | BODY MASS INDEX: 23.8 KG/M2 | HEIGHT: 71 IN

## 2022-02-14 DIAGNOSIS — M89.9 DISORDER OF BONE, UNSPECIFIED: ICD-10-CM

## 2022-02-14 PROCEDURE — 99496 TRANSJ CARE MGMT HIGH F2F 7D: CPT | Mod: 25

## 2022-02-14 PROCEDURE — 36415 COLL VENOUS BLD VENIPUNCTURE: CPT

## 2022-02-14 RX ORDER — APIXABAN 5 MG/1
5 TABLET, FILM COATED ORAL
Qty: 180 | Refills: 3 | Status: DISCONTINUED | COMMUNITY
Start: 2021-07-06 | End: 2022-02-14

## 2022-02-14 NOTE — HISTORY OF PRESENT ILLNESS
[Post-hospitalization from ___ Hospital] : Post-hospitalization from [unfilled] Hospital [Admitted on: ___] : The patient was admitted on [unfilled] [Discharged on ___] : discharged on [unfilled] [Discharge Summary] : discharge summary [Pertinent Labs] : pertinent labs [Radiology Findings] : radiology findings [Discharge Med List] : discharge medication list [Med Reconciliation] : medication reconciliation has been completed [Patient Contacted By: ____] : and contacted by [unfilled] [FreeTextEntry2] : 86-year-old male with a history of mild cognitive impairment, lumbar radiculopathy, A. fib, anemia, BPH, presents due to hospitalization for GI bleed\par Patient was admitted with the above dates for decreasing hemoglobin in the setting of a positive guaiac.  The patient's hemoglobin dropped as low as 6.8 received 2 units RBC on discharge was 8.8 after being stable for at least 5 days.  Patient had endoscopy colonoscopy no significant findings.  Pathology showing tubular adenoma.  Patient had Eliquis discontinued due to GI bleed and after discussion with the family per hospitalist note.  Patient notes that cardiology is aware.\par No bleeding since discharge.  Patient and spouse note that he is feeling overall well.  No shortness of breath chest pain pallor\par No acute episodes of bleeding\par The patient notes his back pain has improved.  Visiting nurse is working on physical therapy which was recommended by pain\par Patient has an upcoming appointment with both GI and hematology in regards to anemia.  Is due for capsule study per GI notes.  Hematology to evaluate the lucency found on the thoracic spine as well.

## 2022-02-14 NOTE — ASSESSMENT
[FreeTextEntry1] : GI bleed\par Follow with gastroenterology.  Capsule study\par Lesion of bone\par  hematology for evaluation order an SPEP UPEP\par Anemia secondary to GI bleed\par Consider IV infusions with GI given constipation.  See GI for capsule study.  Continued holding Eliquis.\par Back pain continue following with pain management however will have the patient get PT through visiting nurses services\par Patient will schedule to discuss BPH with urology.\par

## 2022-02-15 LAB
ANION GAP SERPL CALC-SCNC: 10 MMOL/L
BASOPHILS # BLD AUTO: 0.03 K/UL
BASOPHILS NFR BLD AUTO: 0.8 %
BUN SERPL-MCNC: 18 MG/DL
CALCIUM SERPL-MCNC: 8.9 MG/DL
CHLORIDE SERPL-SCNC: 109 MMOL/L
CO2 SERPL-SCNC: 23 MMOL/L
CREAT SERPL-MCNC: 0.76 MG/DL
EOSINOPHIL # BLD AUTO: 0.02 K/UL
EOSINOPHIL NFR BLD AUTO: 0.5 %
GLUCOSE SERPL-MCNC: 130 MG/DL
HCT VFR BLD CALC: 29.5 %
HGB BLD-MCNC: 8.5 G/DL
IMM GRANULOCYTES NFR BLD AUTO: 0.3 %
LYMPHOCYTES # BLD AUTO: 1.12 K/UL
LYMPHOCYTES NFR BLD AUTO: 30.2 %
MAN DIFF?: NORMAL
MCHC RBC-ENTMCNC: 25 PG
MCHC RBC-ENTMCNC: 28.8 GM/DL
MCV RBC AUTO: 86.8 FL
MONOCYTES # BLD AUTO: 0.4 K/UL
MONOCYTES NFR BLD AUTO: 10.8 %
NEUTROPHILS # BLD AUTO: 2.13 K/UL
NEUTROPHILS NFR BLD AUTO: 57.4 %
PLATELET # BLD AUTO: 191 K/UL
POTASSIUM SERPL-SCNC: 4.1 MMOL/L
RBC # BLD: 3.4 M/UL
RBC # FLD: 22.7 %
SODIUM SERPL-SCNC: 142 MMOL/L
WBC # FLD AUTO: 3.71 K/UL

## 2022-02-18 ENCOUNTER — RESULT REVIEW (OUTPATIENT)
Age: 87
End: 2022-02-18

## 2022-02-18 ENCOUNTER — APPOINTMENT (OUTPATIENT)
Dept: HEMATOLOGY ONCOLOGY | Facility: CLINIC | Age: 87
End: 2022-02-18
Payer: MEDICARE

## 2022-02-18 VITALS
RESPIRATION RATE: 18 BRPM | WEIGHT: 169 LBS | OXYGEN SATURATION: 97 % | HEIGHT: 71 IN | TEMPERATURE: 97.8 F | BODY MASS INDEX: 23.66 KG/M2 | HEART RATE: 80 BPM | DIASTOLIC BLOOD PRESSURE: 81 MMHG | SYSTOLIC BLOOD PRESSURE: 140 MMHG

## 2022-02-18 LAB
ALBUMIN MFR SERPL ELPH: 42.3 %
ALBUMIN SERPL-MCNC: 3 G/DL
ALBUMIN/GLOB SERPL: 0.7 RATIO
ALPHA1 GLOB MFR SERPL ELPH: 4.5 %
ALPHA1 GLOB SERPL ELPH-MCNC: 0.3 G/DL
ALPHA2 GLOB MFR SERPL ELPH: 7.7 %
ALPHA2 GLOB SERPL ELPH-MCNC: 0.6 G/DL
B-GLOBULIN MFR SERPL ELPH: 14.8 %
B-GLOBULIN SERPL ELPH-MCNC: 1.1 G/DL
GAMMA GLOB FLD ELPH-MCNC: 2.2 G/DL
GAMMA GLOB MFR SERPL ELPH: 30.7 %
INTERPRETATION SERPL IEP-IMP: NORMAL
PROT SERPL-MCNC: 7.2 G/DL
PROT SERPL-MCNC: 7.2 G/DL

## 2022-02-18 PROCEDURE — 36415 COLL VENOUS BLD VENIPUNCTURE: CPT

## 2022-02-18 PROCEDURE — 99205 OFFICE O/P NEW HI 60 MIN: CPT | Mod: 25

## 2022-02-18 RX ORDER — HYDROCHLOROTHIAZIDE 25 MG/1
25 TABLET ORAL DAILY
Qty: 90 | Refills: 3 | Status: COMPLETED | COMMUNITY
Start: 2021-04-07 | End: 2022-02-18

## 2022-02-18 RX ORDER — FLUTICASONE PROPIONATE 50 UG/1
50 SPRAY, METERED NASAL
Qty: 16 | Refills: 0 | Status: COMPLETED | COMMUNITY
Start: 2019-05-31 | End: 2022-02-18

## 2022-02-18 RX ORDER — FLUTICASONE PROPIONATE 50 UG/1
50 SPRAY, METERED NASAL
Qty: 16 | Refills: 0 | Status: COMPLETED | COMMUNITY
Start: 2022-02-02 | End: 2022-02-18

## 2022-02-18 NOTE — REASON FOR VISIT
[Initial Consultation] : an initial consultation for [FreeTextEntry2] : leukopenia and normocytic anemia

## 2022-02-18 NOTE — PHYSICAL EXAM
[Capable of only limited self care, confined to bed or chair more than 50% of waking hours] : Status 3- Capable of only limited self care, confined to bed or chair more than 50% of waking hours [Normal] : normal spine exam without palpable tenderness, no kyphosis or scoliosis [de-identified] : Dementia

## 2022-02-18 NOTE — REVIEW OF SYSTEMS
[Recent Change In Weight] : ~T recent weight change [Vision Problems] : vision problems [Abdominal Pain] : abdominal pain [Constipation] : constipation [Dysuria] : dysuria [Muscle Pain] : muscle pain [Difficulty Walking] : difficulty walking [Negative] : Allergic/Immunologic [FreeTextEntry2] : -50lbs in one year [FreeTextEntry9] : walks with cane [de-identified] : mild cognitive impairment

## 2022-02-18 NOTE — HISTORY OF PRESENT ILLNESS
[de-identified] : Mr. Ahmet Soto is 86 year old male with leukopenia and normocytic anemia here for consultation. Patient is referred by Dr. Still. Patient is accompanied by his wife. Patient has documented mild cognitive impairment but is able to respond to simple questions and commands. Patients wife is relaying most information for todays visit. Patients wife states that he has lost around 50lbs in one year, unintentionally. Patient with past medical history of A Fib on Eliquis and  normocytic anemia who was recently discharged at [facility] on 2/8/22 for acute intermittent bright red blood per rectum, guaiac-positive stools, and Hgb was 6.8. Patient received 2 units of PRBCS. EGD w/ Dr. Hooks unrevealing 2/4/22.  Colonoscopy 2/5/22 w/ multiple (9) polyps, diverticulosis, but no clear bleeding source. Eliquis discontinued at this time. \par \par 2/15/22 wbc 3.71 Hgb 8.5 hct 29.6 mcv 86.8 plt 191\par 2/4/22 Ferritin 30\par Leucopenia since 2019, hx of thrombocytopenia\par \par Social Hx:\par Smoke:  former smoker\par ETOH: None\par Illicit drug use: None\par \par Family Hx:\par Denies significant family history

## 2022-02-18 NOTE — ASSESSMENT
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 Obtain PET/CT\par To get capsule endoscopy\par  \par Patient's wife (Lulu) did majority of the communication due to patients dementia\par She had multiple questions which were answered to satisfaction\par \par Follow up in  3 weeks\par No labs

## 2022-02-23 ENCOUNTER — NON-APPOINTMENT (OUTPATIENT)
Age: 87
End: 2022-02-23

## 2022-02-23 ENCOUNTER — APPOINTMENT (OUTPATIENT)
Dept: GASTROENTEROLOGY | Facility: CLINIC | Age: 87
End: 2022-02-23
Payer: MEDICARE

## 2022-02-23 VITALS
BODY MASS INDEX: 23.8 KG/M2 | HEIGHT: 71 IN | SYSTOLIC BLOOD PRESSURE: 118 MMHG | DIASTOLIC BLOOD PRESSURE: 76 MMHG | WEIGHT: 170 LBS | HEART RATE: 72 BPM | TEMPERATURE: 97 F

## 2022-02-23 DIAGNOSIS — Z83.71 ENCOUNTER FOR SCREENING FOR MALIGNANT NEOPLASM OF COLON: ICD-10-CM

## 2022-02-23 DIAGNOSIS — Z12.11 ENCOUNTER FOR SCREENING FOR MALIGNANT NEOPLASM OF COLON: ICD-10-CM

## 2022-02-23 LAB
ALBUPE: 15.7 %
ALPHA1UPE: 36.7 %
ALPHA2UPE: 18.6 %
BETAUPE: 18.8 %
CREAT 24H UR-MCNC: NORMAL G/24 H
CREATININE UR (MAYO): 121 MG/DL
GAMMAUPE: 10.2 %
IGA 24H UR QL IFE: NORMAL
KAPPA LC 24H UR QL: NORMAL
PROT PATTERN 24H UR ELPH-IMP: NORMAL
PROT UR-MCNC: 18 MG/DL
PROT UR-MCNC: 18 MG/DL
SPECIMEN VOL 24H UR: NORMAL ML

## 2022-02-23 PROCEDURE — 99213 OFFICE O/P EST LOW 20 MIN: CPT

## 2022-02-23 RX ORDER — CHLORHEXIDINE GLUCONATE 4 %
325 (65 FE) LIQUID (ML) TOPICAL
Refills: 0 | Status: ACTIVE | COMMUNITY

## 2022-02-23 RX ORDER — FERROUS SULFATE TAB EC 324 MG (65 MG FE EQUIVALENT) 324 (65 FE) MG
324 (65 FE) TABLET DELAYED RESPONSE ORAL
Qty: 60 | Refills: 1 | Status: DISCONTINUED | COMMUNITY
End: 2022-02-23

## 2022-02-23 NOTE — PHYSICAL EXAM
[General Appearance - Alert] : alert [General Appearance - In No Acute Distress] : in no acute distress [Sclera] : the sclera and conjunctiva were normal [Outer Ear] : the ears and nose were normal in appearance [] : no respiratory distress [Abdomen Soft] : soft [No CVA Tenderness] : no ~M costovertebral angle tenderness [Abnormal Walk] : normal gait [Skin Color & Pigmentation] : normal skin color and pigmentation [No Focal Deficits] : no focal deficits [Oriented To Time, Place, And Person] : oriented to person, place, and time

## 2022-02-23 NOTE — ASSESSMENT
[FreeTextEntry1] : Anemia:  Capsule  study to complete  work up.  Hematology evaluation as per Dr. Santos\par \par Pertinent available records reviewed\par

## 2022-02-23 NOTE — HISTORY OF PRESENT ILLNESS
[de-identified] : Presents  for follow up after recent  hospitalization at [place] for anemia ( on Eliquis) with guaiac  positive  stools  ( 2/2022) .  EGD was notable  for gastritis. Colonoscopy was notable  for 4  polyps ( tubular  adenomas-  Dr. Urbina)  / hemorrhoids / diverticulosis.  No further melena\par \par Most  recent  CBC  ( 2/18/2022) was notable  for HGB = 8.5 with normal MCV.  CMET was unremarkable\par \par Patient has ongoing hematology evaluation with Dr. Bañuelos

## 2022-02-24 ENCOUNTER — NON-APPOINTMENT (OUTPATIENT)
Age: 87
End: 2022-02-24

## 2022-03-07 ENCOUNTER — APPOINTMENT (OUTPATIENT)
Age: 87
End: 2022-03-07
Payer: MEDICARE

## 2022-03-07 VITALS
DIASTOLIC BLOOD PRESSURE: 80 MMHG | TEMPERATURE: 97.8 F | BODY MASS INDEX: 23.8 KG/M2 | SYSTOLIC BLOOD PRESSURE: 160 MMHG | OXYGEN SATURATION: 97 % | HEART RATE: 75 BPM | HEIGHT: 71 IN | WEIGHT: 170 LBS

## 2022-03-07 PROCEDURE — 99213 OFFICE O/P EST LOW 20 MIN: CPT

## 2022-03-07 NOTE — HISTORY OF PRESENT ILLNESS
[de-identified] : 86-year-old male with a history of anemia multifactorial presenting today for follow-up\par Patient is due for capsule study with GI\par No continued bleeding is noted with a recent hemoglobin which is stable at 8.5\par Patient is following with hematology and underwent a PET scan which was significant for likely prostate cancer given the elevated PSA.\par The liver lesion and the lesions of bone appear to be benign based off the PET scan will allow hematology to review with patient

## 2022-03-07 NOTE — PLAN
[FreeTextEntry1] : GI bleed\par Following gastroenterology.  Noted to have a need for pantoprazole\par Continuing pantoprazole for now patient is due for capsule study\par \par Hypertension stable continue to monitor-elevated today at most recent visits were all normal however\par \par Follow with hematology\par \par Likely prostate cancer given the PSA and recent findings\par Recommending to the patient follow-up with hematology oncology first.  Consider the findings with hematology to determine best option if need for urology.  Patient is already in the midst of scheduling with urology and has a phone number for urology.\par \par Reviewed recent lab work with the patient.  Including the stable hemoglobin.\par

## 2022-03-08 ENCOUNTER — APPOINTMENT (OUTPATIENT)
Dept: HEMATOLOGY ONCOLOGY | Facility: CLINIC | Age: 87
End: 2022-03-08
Payer: MEDICARE

## 2022-03-08 VITALS
OXYGEN SATURATION: 96 % | BODY MASS INDEX: 24.92 KG/M2 | HEIGHT: 71 IN | WEIGHT: 178 LBS | TEMPERATURE: 96.1 F | HEART RATE: 93 BPM | SYSTOLIC BLOOD PRESSURE: 172 MMHG | DIASTOLIC BLOOD PRESSURE: 90 MMHG | RESPIRATION RATE: 18 BRPM

## 2022-03-08 DIAGNOSIS — R77.8 OTHER SPECIFIED ABNORMALITIES OF PLASMA PROTEINS: ICD-10-CM

## 2022-03-08 PROCEDURE — 99214 OFFICE O/P EST MOD 30 MIN: CPT

## 2022-03-08 RX ORDER — TIMOLOL MALEATE 2.5 MG/ML
0.25 SOLUTION/ DROPS OPHTHALMIC
Qty: 20 | Refills: 0 | Status: ACTIVE | COMMUNITY
Start: 2021-07-20

## 2022-03-08 NOTE — PHYSICAL EXAM
[Capable of only limited self care, confined to bed or chair more than 50% of waking hours] : Status 3- Capable of only limited self care, confined to bed or chair more than 50% of waking hours [Normal] : normal spine exam without palpable tenderness, no kyphosis or scoliosis [de-identified] : Dementia

## 2022-03-08 NOTE — HISTORY OF PRESENT ILLNESS
[de-identified] : Mr. Ahmet Soto is 86 year old male with leukopenia and normocytic anemia here for consultation. Patient is referred by Dr. Still. Patient is accompanied by his wife. Patient has documented mild cognitive impairment but is able to respond to simple questions and commands. Patients wife is relaying most information for todays visit. Patients wife states that he has lost around 50lbs in one year, unintentionally. Patient with past medical history of A Fib on Eliquis and  normocytic anemia who was recently discharged at [facility] on 2/8/22 for acute intermittent bright red blood per rectum, guaiac-positive stools, and Hgb was 6.8. Patient received 2 units of PRBCS. EGD w/ Dr. Hooks unrevealing 2/4/22.  Colonoscopy 2/5/22 w/ multiple (9) polyps, diverticulosis, but no clear bleeding source. Eliquis discontinued at this time. \par \par 2/15/22 wbc 3.71 Hgb 8.5 hct 29.6 mcv 86.8 plt 191\par 2/4/22 Ferritin 30\par Leucopenia since 2019, hx of thrombocytopenia\par \par Social Hx:\par Smoke:  former smoker\par ETOH: None\par Illicit drug use: None\par \par Family Hx:\par Denies significant family history

## 2022-03-08 NOTE — REVIEW OF SYSTEMS
[Recent Change In Weight] : ~T recent weight change [Vision Problems] : vision problems [Abdominal Pain] : abdominal pain [Constipation] : constipation [Dysuria] : dysuria [Muscle Pain] : muscle pain [Difficulty Walking] : difficulty walking [Negative] : Allergic/Immunologic [FreeTextEntry2] : -50lbs in one year [FreeTextEntry9] : walks with cane [de-identified] : mild cognitive impairment

## 2022-03-08 NOTE — ASSESSMENT
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 All questions were answered to satisfaction. Patient agrees to pursue IV iron.\par Schedule IV venofer 200 mg  x 5\par \par Elevated PSA\par Bone lesion/ weight loss\par PSMA PET/CT - focal prostate uptake, possibly primary malignancy\par Strongly encouraged to see urology\par  \par Patient's wife (Lulu) did majority of the communication due to patients dementia\par She had multiple questions which were answered to satisfaction\par \par Labs in 6-8 weeks\par CBC, CMP, anemia panel\par OV few days later

## 2022-03-14 ENCOUNTER — APPOINTMENT (OUTPATIENT)
Dept: GASTROENTEROLOGY | Facility: CLINIC | Age: 87
End: 2022-03-14

## 2022-03-19 ENCOUNTER — RESULT REVIEW (OUTPATIENT)
Age: 87
End: 2022-03-19

## 2022-03-22 ENCOUNTER — APPOINTMENT (OUTPATIENT)
Dept: GASTROENTEROLOGY | Facility: HOSPITAL | Age: 87
End: 2022-03-22

## 2022-03-28 ENCOUNTER — NON-APPOINTMENT (OUTPATIENT)
Age: 87
End: 2022-03-28

## 2022-03-31 ENCOUNTER — TRANSCRIPTION ENCOUNTER (OUTPATIENT)
Age: 87
End: 2022-03-31

## 2022-03-31 ENCOUNTER — NON-APPOINTMENT (OUTPATIENT)
Age: 87
End: 2022-03-31

## 2022-04-08 ENCOUNTER — APPOINTMENT (OUTPATIENT)
Dept: UROLOGY | Facility: CLINIC | Age: 87
End: 2022-04-08
Payer: MEDICARE

## 2022-04-08 VITALS — TEMPERATURE: 98.1 F | SYSTOLIC BLOOD PRESSURE: 192 MMHG | DIASTOLIC BLOOD PRESSURE: 88 MMHG | HEART RATE: 97 BPM

## 2022-04-08 PROCEDURE — 99204 OFFICE O/P NEW MOD 45 MIN: CPT

## 2022-04-08 PROCEDURE — 81000 URINALYSIS NONAUTO W/SCOPE: CPT

## 2022-04-11 ENCOUNTER — APPOINTMENT (OUTPATIENT)
Dept: FAMILY MEDICINE | Facility: CLINIC | Age: 87
End: 2022-04-11
Payer: MEDICARE

## 2022-04-11 VITALS
HEART RATE: 91 BPM | WEIGHT: 180 LBS | SYSTOLIC BLOOD PRESSURE: 138 MMHG | HEIGHT: 71 IN | TEMPERATURE: 97.8 F | OXYGEN SATURATION: 99 % | DIASTOLIC BLOOD PRESSURE: 80 MMHG | BODY MASS INDEX: 25.2 KG/M2

## 2022-04-11 DIAGNOSIS — A28.0 PASTEURELLOSIS: ICD-10-CM

## 2022-04-11 PROCEDURE — 36415 COLL VENOUS BLD VENIPUNCTURE: CPT

## 2022-04-11 PROCEDURE — 99495 TRANSJ CARE MGMT MOD F2F 14D: CPT | Mod: 25

## 2022-04-11 NOTE — HISTORY OF PRESENT ILLNESS
[Discharge Summary] : discharge summary [Pertinent Labs] : pertinent labs [Radiology Findings] : radiology findings [Discharge Med List] : discharge medication list [Med Reconciliation] : medication reconciliation has been completed [Post-hospitalization from ___ Hospital] : Post-hospitalization from [unfilled] Hospital [Admitted on: ___] : The patient was admitted on [unfilled] [Discharged on ___] : discharged on [unfilled] [Patient Contacted By: ____] : and contacted by [unfilled] [FreeTextEntry2] : 86-year-old male history of anemia secondary to GI bleed, A. 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  During that time he was given antibiotics and fluids which improved his condition over time.  He was evaluated by multiple specialties including nephrology due to PAVITHRA where there was suspicion for ATN as a result of previous treatments including contrast.  The patient received albumin.  The patient was seen by ID there is no clear reason why this patient had Pasteurella in his bloodstream but he has continued on Augmentin since discharge which will be complete in 3 days completing 14-day course.\par The patient was found to have a right leg DVT below the knee.  The risk of PE was thought to be low given the patient's high risk of GI bleed.  He was discharged and advised to follow-up for ultrasound\par At this time the patient's mental status has returned to baseline.  He is currently taking Seroquel.  He was started on losartan 50, metoprolol 25, Lasix 20\par The patient has felt well.  He has been slightly short of breath with some cough since discharge.  He has had some edema of the lower extremities bilaterally\par \par

## 2022-04-11 NOTE — ASSESSMENT
[FreeTextEntry1] : Patient is a 86 year male who presents with elevated PSA and abnormal PET scan.  He is referred by his Oncologist for weight loss during the work up he was noted to have PSA 27 and PET Scan that showed activity in the prostate. He has a history of BPH but no worsening in symptoms during this period of time.  \par No family history of prostate cancer.\par \par A/P\par 1. elevated PSA\par 2. abnormal prostate exam\par 3. PET activity in prostate\par 4. weight loss\par \par Discussed findings with patient and wife.  Risks and benefits of prostate needle biopsy in office discussed especially infection and risk of hospitalization.  He understands and wishes to proceed.\par > 45 minutes in review of data and discussion with patient and wife\par

## 2022-04-11 NOTE — PHYSICAL EXAM
[Normal] : soft, non-tender, non-distended, no masses palpated, no HSM and normal bowel sounds [de-identified] : Decreased lung sounds on the left [de-identified] : Irregular rhythm [de-identified] : Pleasant edema of bilateral ankles

## 2022-04-11 NOTE — PHYSICAL EXAM
[Well Groomed] : well groomed [Edema] : no peripheral edema [] : no respiratory distress [Abdomen Soft] : soft [Abdomen Hernia] : no hernia was discovered [Urethral Meatus] : meatus normal [Penis Abnormality] : normal circumcised penis [Urinary Bladder Findings] : the bladder was normal on palpation [Scrotum] : the scrotum was normal [Epididymis] : the epididymides were normal [Testes Tenderness] : no tenderness of the testes [Testes Mass (___cm)] : there were no testicular masses [Anus Abnormality] : the anus and perineum were normal [Prostate Tenderness] : the prostate was not tender [Prostate Size ___ gm] : prostate size [unfilled] gm [FreeTextEntry1] : right lobe firm/hard [Normal Station and Gait] : the gait and station were normal for the patient's age [Skin Color & Pigmentation] : normal skin color and pigmentation [Oriented To Time, Place, And Person] : oriented to person, place, and time [Inguinal Lymph Nodes Enlarged Bilaterally] : inguinal

## 2022-04-14 ENCOUNTER — APPOINTMENT (OUTPATIENT)
Dept: CARDIOLOGY | Facility: CLINIC | Age: 87
End: 2022-04-14
Payer: MEDICARE

## 2022-04-14 ENCOUNTER — NON-APPOINTMENT (OUTPATIENT)
Age: 87
End: 2022-04-14

## 2022-04-14 VITALS
BODY MASS INDEX: 24.78 KG/M2 | HEART RATE: 67 BPM | HEIGHT: 71 IN | TEMPERATURE: 98 F | WEIGHT: 177 LBS | OXYGEN SATURATION: 99 %

## 2022-04-14 VITALS
SYSTOLIC BLOOD PRESSURE: 158 MMHG | DIASTOLIC BLOOD PRESSURE: 88 MMHG | HEIGHT: 71 IN | OXYGEN SATURATION: 99 % | BODY MASS INDEX: 24.78 KG/M2 | HEART RATE: 67 BPM | WEIGHT: 177 LBS | TEMPERATURE: 98 F

## 2022-04-14 DIAGNOSIS — A40.3 SEPSIS DUE TO STREPTOCOCCUS PNEUMONIAE: ICD-10-CM

## 2022-04-14 PROCEDURE — 93000 ELECTROCARDIOGRAM COMPLETE: CPT

## 2022-04-14 PROCEDURE — 99215 OFFICE O/P EST HI 40 MIN: CPT

## 2022-04-14 NOTE — DISCUSSION/SUMMARY
[FreeTextEntry1] : Lab data done earlier this week is as follows White count 3.62 hemoglobin 9.0 representing a slight increase from previous readings platelet count 260 ferritin 147 within normal limits iron saturation 13% low.  Patient has been receiving IV iron infusions.  Sodium 143 potassium 3.9 chloride 109 CO2 24 glucose 103 BUN 10 creatinine 1.02 albumin 3.0 calcium 8.4\par Patient generally appears stable at this time.  I did not see him during his recent [facility] admission.  He is still recuperating and is slightly weak.  There is some shortness of breath on exertion.  The salient finding on examination today was diminished breath sounds at the left base.  There is also chronic venous stasis changes and mild leg and ankle edema bilaterally.  There is a grade 2 systolic murmur at the level left sternal border.  The echocardiogram done during the recent hospital stay revealed a slightly diminished ejection fraction at 45% mild AR and mild MR.  Does not appear that the patient's symptoms would be remediable with any kind of invasive cardiac intervention. \par The patient's blood pressure has drifted upward following hospital discharge his losartan was decreased in hospital to 50 mg likely because of septic hypotension at the time of admission I am now recommending that the dosage be increased at 100 mg daily.\par The patient's persistent pulmonary findings will be reevaluated tomorrow he is scheduled for chest x-ray and I believe he will also be having a CT scan of the chest.  It would be advisable in light of the findings on physical examination.\par Today's electrocardiogram reveals atrial fibrillation PVDs and no acute ST-T wave abnormalities\par \par In light of the recent severe anemia and evidence of iron deficiency full anticoagulation is being withheld at the present time pending further evaluation.

## 2022-04-14 NOTE — HISTORY OF PRESENT ILLNESS
[FreeTextEntry1] : Review of the patient's recent hospital admission reveals the following diagnoses acute toxic metabolic encephalopathy secondary to septicemia, septic shock resolved, Pasteurella bacteremia, acute kidney injury, acute on chronic anemia status post packed red blood cells, smoldering DIC, acute right soleal vein DVT.\par Patient received 1 unit of packed cells during this last admission.  The patient received a course of Augmentin following discharge.  He received Rocephin while he was in the hospital.  Was not discharged on anticoagulation because of severe anemia and bleeding risk.  Patient's losartan was decreased to 50 mg daily.  The last chest x-ray done in hospital revealed enlargement of the cardiac silhouette aorta was tortuous left retrocardiac opacity is unchanged likely small left pleural effusion mild vascular congestion.  No evidence of an acute neurologic event or cerebral ischemia.

## 2022-04-14 NOTE — CARDIOLOGY SUMMARY
[de-identified] : Echocardiography performed on March 26, 2022 reveals the following principal findings.  Estimated ejection fraction 45%.  Normal RV function.  Dilated right atrium, dilated left atrium, mild mitral regurgitation, mild aortic regurgitation.  Estimated PA pressure 54 mmHg..

## 2022-04-14 NOTE — REASON FOR VISIT
[FreeTextEntry1] : Patient returns to the office following a recent [facility] admission.  He was admitted late in March with symptoms of septicemia.  Blood cultures grew Pasteurella which are suspicious for dog bite however the patient has had no known dog bite or other injury related to his dog.  The patient responded well to IV antibiotics.\par Following discharge from the hospital he does describe some shortness of breath.  His dyspnea on exertion.\par I have followed the patient for many years with the diagnosis of chronic atrial fibrillation for which she has been on anticoagulation he had been on warfarin for many years but recently switched to Eliquis.\par However anticoagulation was recently withheld because of evidence of bleeding and anemia.  The source of the bleeding has never been entirely elucidated.

## 2022-04-15 ENCOUNTER — RESULT REVIEW (OUTPATIENT)
Age: 87
End: 2022-04-15

## 2022-04-15 ENCOUNTER — NON-APPOINTMENT (OUTPATIENT)
Age: 87
End: 2022-04-15

## 2022-04-18 ENCOUNTER — RESULT REVIEW (OUTPATIENT)
Age: 87
End: 2022-04-18

## 2022-04-18 DIAGNOSIS — Z98.890 OTHER SPECIFIED POSTPROCEDURAL STATES: ICD-10-CM

## 2022-04-20 ENCOUNTER — NON-APPOINTMENT (OUTPATIENT)
Age: 87
End: 2022-04-20

## 2022-04-20 ENCOUNTER — APPOINTMENT (OUTPATIENT)
Dept: UROLOGY | Facility: CLINIC | Age: 87
End: 2022-04-20
Payer: MEDICARE

## 2022-04-20 VITALS — SYSTOLIC BLOOD PRESSURE: 171 MMHG | HEART RATE: 98 BPM | DIASTOLIC BLOOD PRESSURE: 96 MMHG

## 2022-04-20 VITALS
BODY MASS INDEX: 24.5 KG/M2 | SYSTOLIC BLOOD PRESSURE: 174 MMHG | DIASTOLIC BLOOD PRESSURE: 90 MMHG | HEART RATE: 80 BPM | WEIGHT: 175 LBS | HEIGHT: 71 IN

## 2022-04-20 VITALS — DIASTOLIC BLOOD PRESSURE: 94 MMHG | HEART RATE: 89 BPM | SYSTOLIC BLOOD PRESSURE: 166 MMHG

## 2022-04-20 DIAGNOSIS — J98.19 OTHER PULMONARY COLLAPSE: ICD-10-CM

## 2022-04-20 LAB
ALBUMIN SERPL ELPH-MCNC: 3 G/DL
ALP BLD-CCNC: 101 U/L
ALT SERPL-CCNC: 13 U/L
ANION GAP SERPL CALC-SCNC: 10 MMOL/L
AST SERPL-CCNC: 24 U/L
BASOPHILS # BLD AUTO: 0.03 K/UL
BASOPHILS NFR BLD AUTO: 0.8 %
BILIRUB SERPL-MCNC: 0.8 MG/DL
BUN SERPL-MCNC: 10 MG/DL
CALCIUM SERPL-MCNC: 8.4 MG/DL
CHLORIDE SERPL-SCNC: 109 MMOL/L
CO2 SERPL-SCNC: 24 MMOL/L
CREAT SERPL-MCNC: 1.02 MG/DL
DEPRECATED D DIMER PPP IA-ACNC: 535 NG/ML DDU
EGFR: 72 ML/MIN/1.73M2
EOSINOPHIL # BLD AUTO: 0.03 K/UL
EOSINOPHIL NFR BLD AUTO: 0.8 %
FERRITIN SERPL-MCNC: 147 NG/ML
GLUCOSE SERPL-MCNC: 103 MG/DL
HCT VFR BLD CALC: 32 %
HGB BLD-MCNC: 9 G/DL
IMM GRANULOCYTES NFR BLD AUTO: 0.3 %
IRON SATN MFR SERPL: 13 %
IRON SERPL-MCNC: 31 UG/DL
LYMPHOCYTES # BLD AUTO: 1.02 K/UL
LYMPHOCYTES NFR BLD AUTO: 28.2 %
MAN DIFF?: NORMAL
MCHC RBC-ENTMCNC: 24.9 PG
MCHC RBC-ENTMCNC: 28.1 GM/DL
MCV RBC AUTO: 88.4 FL
MONOCYTES # BLD AUTO: 0.39 K/UL
MONOCYTES NFR BLD AUTO: 10.8 %
NEUTROPHILS # BLD AUTO: 2.14 K/UL
NEUTROPHILS NFR BLD AUTO: 59.1 %
PLATELET # BLD AUTO: 260 K/UL
POTASSIUM SERPL-SCNC: 3.9 MMOL/L
PROT SERPL-MCNC: 7.4 G/DL
RBC # BLD: 3.62 M/UL
RBC # FLD: 27.3 %
SODIUM SERPL-SCNC: 143 MMOL/L
TIBC SERPL-MCNC: 244 UG/DL
UIBC SERPL-MCNC: 213 UG/DL
WBC # FLD AUTO: 3.62 K/UL

## 2022-04-20 PROCEDURE — 76872 US TRANSRECTAL: CPT

## 2022-04-20 PROCEDURE — 55700: CPT

## 2022-04-21 ENCOUNTER — RESULT REVIEW (OUTPATIENT)
Age: 87
End: 2022-04-21

## 2022-04-28 LAB — CORE LAB BIOPSY: NORMAL

## 2022-05-03 ENCOUNTER — APPOINTMENT (OUTPATIENT)
Dept: FAMILY MEDICINE | Facility: CLINIC | Age: 87
End: 2022-05-03
Payer: MEDICARE

## 2022-05-03 ENCOUNTER — LABORATORY RESULT (OUTPATIENT)
Age: 87
End: 2022-05-03

## 2022-05-03 VITALS
SYSTOLIC BLOOD PRESSURE: 130 MMHG | HEART RATE: 61 BPM | OXYGEN SATURATION: 98 % | HEIGHT: 71 IN | TEMPERATURE: 97.8 F | WEIGHT: 175 LBS | DIASTOLIC BLOOD PRESSURE: 70 MMHG | BODY MASS INDEX: 24.5 KG/M2

## 2022-05-03 DIAGNOSIS — R65.21 PERSONAL HISTORY OF OTHER INFECTIOUS AND PARASITIC DISEASES: ICD-10-CM

## 2022-05-03 DIAGNOSIS — Z86.19 PERSONAL HISTORY OF OTHER INFECTIOUS AND PARASITIC DISEASES: ICD-10-CM

## 2022-05-03 DIAGNOSIS — K92.2 GASTROINTESTINAL HEMORRHAGE, UNSPECIFIED: ICD-10-CM

## 2022-05-03 PROCEDURE — 36415 COLL VENOUS BLD VENIPUNCTURE: CPT

## 2022-05-03 PROCEDURE — 99214 OFFICE O/P EST MOD 30 MIN: CPT | Mod: 25

## 2022-05-03 NOTE — ASSESSMENT
[FreeTextEntry1] : GI bleed\par Patient has a history of GI bleed suspected given the anemia of iron deficiency.  Was recently restarted on Eliquis due to the risk associated with the DVT as well as his history of A. fib\par General stability of the hemoglobin was noted\par Patient to have a CBC and a BUN checked today to evaluate\par We will also check a ferritin\par \par Hypertension well-controlled continue current management\par \par Right leg DVT\par Continue the Eliquis\par Lets make sure to monitor this moving forward.  We will likely get a new ultrasound by the end of the month\par \par Elevated PSA\par The patient had a biopsy performed by urology.  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  Was going to discuss today with patient however they requested to fully review everything with urology rather than to do so here.  Due to concern for patient worrying until he reaches Friday\par \par The CT scan imaging showed possible cirrhosis.  However the patient's history is not consistent with this.  The MRI of the abdomen did not show signs of cirrhosis but did show hepatic lesion which has been followed by GI\par Given the findings of adenocarcinoma the patient will be following with hematology oncology regarding this as well.  CT scan imaging also revealed calcifications versus contrast ingestion/aspiration.  No signs of pneumothorax.  Patient continues to have shortness of breath which is likely multifactorial we will continue to monitor for now

## 2022-05-04 LAB
ALBUMIN SERPL ELPH-MCNC: 3.5 G/DL
ALP BLD-CCNC: 114 U/L
ALT SERPL-CCNC: 9 U/L
ANION GAP SERPL CALC-SCNC: 11 MMOL/L
AST SERPL-CCNC: 20 U/L
BASOPHILS # BLD AUTO: 0.1 K/UL
BASOPHILS NFR BLD AUTO: 3.6 %
BILIRUB SERPL-MCNC: 1.4 MG/DL
BUN SERPL-MCNC: 14 MG/DL
CALCIUM SERPL-MCNC: 9.1 MG/DL
CHLORIDE SERPL-SCNC: 108 MMOL/L
CO2 SERPL-SCNC: 24 MMOL/L
CREAT SERPL-MCNC: 0.96 MG/DL
EGFR: 78 ML/MIN/1.73M2
EOSINOPHIL # BLD AUTO: 0 K/UL
EOSINOPHIL NFR BLD AUTO: 0 %
FERRITIN SERPL-MCNC: 117 NG/ML
GLUCOSE SERPL-MCNC: 85 MG/DL
HCT VFR BLD CALC: 36.3 %
HGB BLD-MCNC: 10.6 G/DL
IRON SATN MFR SERPL: 12 %
IRON SERPL-MCNC: 34 UG/DL
LYMPHOCYTES # BLD AUTO: 1.08 K/UL
LYMPHOCYTES NFR BLD AUTO: 39.3 %
MAN DIFF?: NORMAL
MCHC RBC-ENTMCNC: 25.9 PG
MCHC RBC-ENTMCNC: 29.2 GM/DL
MCV RBC AUTO: 88.5 FL
MONOCYTES # BLD AUTO: 0.27 K/UL
MONOCYTES NFR BLD AUTO: 9.8 %
NEUTROPHILS # BLD AUTO: 1.3 K/UL
NEUTROPHILS NFR BLD AUTO: 47.3 %
PLATELET # BLD AUTO: 163 K/UL
POTASSIUM SERPL-SCNC: 4.2 MMOL/L
PROT SERPL-MCNC: 7.8 G/DL
RBC # BLD: 4.1 M/UL
RBC # FLD: 25.2 %
SODIUM SERPL-SCNC: 143 MMOL/L
TIBC SERPL-MCNC: 280 UG/DL
UIBC SERPL-MCNC: 246 UG/DL
WBC # FLD AUTO: 2.74 K/UL

## 2022-05-06 ENCOUNTER — APPOINTMENT (OUTPATIENT)
Dept: UROLOGY | Facility: CLINIC | Age: 87
End: 2022-05-06
Payer: MEDICARE

## 2022-05-06 DIAGNOSIS — R39.89 OTHER SYMPTOMS AND SIGNS INVOLVING THE GENITOURINARY SYSTEM: ICD-10-CM

## 2022-05-06 PROCEDURE — 99215 OFFICE O/P EST HI 40 MIN: CPT

## 2022-05-10 PROBLEM — R39.89 ABNORMAL PROSTATE EXAM: Status: ACTIVE | Noted: 2022-04-11

## 2022-05-10 NOTE — ASSESSMENT
[FreeTextEntry1] : Patient is a 86 year male with elevated PSA in 20s and abnormal PSA.  PNBx 4/22 revealed Gls 4+5=9 high volume disease.  No fevers or problems from biopsy.  \par Patient with dementia.  CT Scan no obvious mets.\par \par A/P\par 1. prostate cancer\par 2. dementia\par \par Discussed diagnosis, prognosis and treatment options in detail with wife and patient.  Not good candidate for radiation given comorbidities and dementia.  Discussed case with patient's Oncologist.  Will get bone scan and start Casodex 50 mg PO QD for 1 month.  He will return in 2 weeks for Lupron depot shot (84 days)\par high decision making\par > 45 minutes spent in review of data and discussion with patient/family and outside physician\par

## 2022-05-10 NOTE — PHYSICAL EXAM
[Well Groomed] : well groomed [General Appearance - In No Acute Distress] : no acute distress [Skin Color & Pigmentation] : normal skin color and pigmentation [Edema] : no peripheral edema [] : no respiratory distress [FreeTextEntry1] : slightly confused

## 2022-05-12 ENCOUNTER — RESULT REVIEW (OUTPATIENT)
Age: 87
End: 2022-05-12

## 2022-05-23 ENCOUNTER — APPOINTMENT (OUTPATIENT)
Dept: UROLOGY | Facility: CLINIC | Age: 87
End: 2022-05-23
Payer: MEDICARE

## 2022-05-23 VITALS
HEART RATE: 92 BPM | DIASTOLIC BLOOD PRESSURE: 73 MMHG | BODY MASS INDEX: 24.5 KG/M2 | HEIGHT: 71 IN | SYSTOLIC BLOOD PRESSURE: 135 MMHG | WEIGHT: 175 LBS

## 2022-05-23 RX ORDER — LEUPROLIDE ACETATE 22.5 MG
22.5 KIT INTRAMUSCULAR
Qty: 1 | Refills: 0 | Status: ACTIVE | COMMUNITY
Start: 2022-05-23

## 2022-05-24 ENCOUNTER — RESULT REVIEW (OUTPATIENT)
Age: 87
End: 2022-05-24

## 2022-05-24 ENCOUNTER — APPOINTMENT (OUTPATIENT)
Dept: HEMATOLOGY ONCOLOGY | Facility: CLINIC | Age: 87
End: 2022-05-24

## 2022-05-25 ENCOUNTER — APPOINTMENT (OUTPATIENT)
Dept: CARDIOLOGY | Facility: CLINIC | Age: 87
End: 2022-05-25
Payer: MEDICARE

## 2022-05-25 VITALS — SYSTOLIC BLOOD PRESSURE: 124 MMHG | HEART RATE: 68 BPM | OXYGEN SATURATION: 98 % | DIASTOLIC BLOOD PRESSURE: 60 MMHG

## 2022-05-25 VITALS — DIASTOLIC BLOOD PRESSURE: 60 MMHG | SYSTOLIC BLOOD PRESSURE: 134 MMHG

## 2022-05-25 PROCEDURE — 99214 OFFICE O/P EST MOD 30 MIN: CPT

## 2022-05-25 RX ORDER — AMOXICILLIN AND CLAVULANATE POTASSIUM 875; 125 MG/1; MG/1
875-125 TABLET, COATED ORAL
Qty: 28 | Refills: 0 | Status: DISCONTINUED | COMMUNITY
Start: 2022-03-30

## 2022-05-25 RX ORDER — ROSUVASTATIN CALCIUM 5 MG/1
5 TABLET, FILM COATED ORAL
Qty: 90 | Refills: 3 | Status: DISCONTINUED | COMMUNITY
Start: 2020-10-15 | End: 2022-05-25

## 2022-05-25 RX ORDER — SULFAMETHOXAZOLE AND TRIMETHOPRIM 800; 160 MG/1; MG/1
800-160 TABLET ORAL TWICE DAILY
Qty: 6 | Refills: 0 | Status: DISCONTINUED | COMMUNITY
Start: 2022-04-18 | End: 2022-05-25

## 2022-05-25 NOTE — PHYSICAL EXAM
[Well Developed] : well developed [No Acute Distress] : no acute distress [Normal S1, S2] : normal S1, S2 [Clear Lung Fields] : clear lung fields [No Respiratory Distress] : no respiratory distress  [Moves all extremities] : moves all extremities [No Focal Deficits] : no focal deficits [de-identified] : irreg [de-identified] : slightly unsteady gait [de-identified] : minimal LE edeam  [de-identified] : no evidence of bleeding or bruising [de-identified] : mild memory impairment

## 2022-05-25 NOTE — CARDIOLOGY SUMMARY
[de-identified] : Echocardiography performed on March 26, 2022 reveals the following principal findings.  Estimated ejection fraction 45%.  Normal RV function.  Dilated right atrium, dilated left atrium, mild mitral regurgitation, mild aortic regurgitation.  Estimated PA pressure 54 mmHg..

## 2022-05-25 NOTE — REVIEW OF SYSTEMS
[Weight Gain (___ Lbs)] : no recent weight gain [Weight Loss (___ Lbs)] : no recent weight loss [SOB] : no shortness of breath [Dyspnea on exertion] : not dyspnea during exertion [Lower Ext Edema] : no extremity edema [Leg Claudication] : no intermittent leg claudication [Palpitations] : no palpitations [Syncope] : no syncope [Cough] : no cough [Joint Pain] : no joint pain [Myalgia] : no myalgia [Rash] : no rash [Dizziness] : no dizziness [Easy Bleeding] : no tendency for easy bleeding [Easy Bruising] : no tendency for easy bruising

## 2022-05-25 NOTE — REASON FOR VISIT
[Arrhythmia/ECG Abnorrmalities] : arrhythmia/ECG abnormalities [Hypertension] : hypertension [Family Member] : family member [FreeTextEntry1] : Multiple medical problems since patient was last seen by me\par Most notably septic shock in March, hospitalized at [facility]\par Anemia- no cause identified  anticoagulation for AFib and DVT sicne resumed at 2.5mg BID\par New diagnosis of prostate cancer - to begin treatment with Casodex and Lupron\par Chest CT done for evaluation of possible PTX - no ptx noted \par \par Last visit with Dr Zavaleta BP elevated and losartan was increased to 100mg daily \par \par Comes with wife today\par Just had first Lupron dose\par Denies CP/SOB/palps\par Denies bleeding or bruising

## 2022-05-31 ENCOUNTER — APPOINTMENT (OUTPATIENT)
Dept: HEMATOLOGY ONCOLOGY | Facility: CLINIC | Age: 87
End: 2022-05-31
Payer: MEDICARE

## 2022-05-31 VITALS
SYSTOLIC BLOOD PRESSURE: 125 MMHG | RESPIRATION RATE: 18 BRPM | BODY MASS INDEX: 23.27 KG/M2 | OXYGEN SATURATION: 99 % | HEIGHT: 71 IN | TEMPERATURE: 97.5 F | HEART RATE: 62 BPM | WEIGHT: 166.25 LBS | DIASTOLIC BLOOD PRESSURE: 67 MMHG

## 2022-05-31 PROCEDURE — 99214 OFFICE O/P EST MOD 30 MIN: CPT | Mod: 25

## 2022-05-31 NOTE — PHYSICAL EXAM
[Capable of only limited self care, confined to bed or chair more than 50% of waking hours] : Status 3- Capable of only limited self care, confined to bed or chair more than 50% of waking hours [Normal] : normal spine exam without palpable tenderness, no kyphosis or scoliosis [de-identified] : Dementia

## 2022-05-31 NOTE — REVIEW OF SYSTEMS
[Recent Change In Weight] : ~T recent weight change [Vision Problems] : vision problems [Abdominal Pain] : abdominal pain [Constipation] : constipation [Dysuria] : dysuria [Muscle Pain] : muscle pain [Difficulty Walking] : difficulty walking [Negative] : Allergic/Immunologic [FreeTextEntry2] : -50lbs in one year [FreeTextEntry9] : walks with cane [de-identified] : mild cognitive impairment

## 2022-05-31 NOTE — HISTORY OF PRESENT ILLNESS
[de-identified] : Mr. Ahmet Soto is 86 year old male with leukopenia and normocytic anemia here for consultation. Patient is referred by Dr. Still. Patient is accompanied by his wife. Patient has documented mild cognitive impairment but is able to respond to simple questions and commands. Patients wife is relaying most information for todays visit. Patients wife states that he has lost around 50lbs in one year, unintentionally. Patient with past medical history of A Fib on Eliquis and  normocytic anemia who was recently discharged at [facility] on 2/8/22 for acute intermittent bright red blood per rectum, guaiac-positive stools, and Hgb was 6.8. Patient received 2 units of PRBCS. EGD w/ Dr. Hooks unrevealing 2/4/22.  Colonoscopy 2/5/22 w/ multiple (9) polyps, diverticulosis, but no clear bleeding source. Eliquis discontinued at this time. \par \par 2/15/22 wbc 3.71 Hgb 8.5 hct 29.6 mcv 86.8 plt 191\par 2/4/22 Ferritin 30\par Leucopenia since 2019, hx of thrombocytopenia\par \par Social Hx:\par Smoke:  former smoker\par ETOH: None\par Illicit drug use: None\par \par Family Hx:\par Denies significant family history [de-identified] : Patient is seen today for follow up\par Accompanied by his wife\par \par He is s/p  IV venofer 200 mg  x 5\par Has more energy with iron infusion\par \par He saw Dr Kraus for prostate cancer and started ADT (casodex -> ADT)\par \par

## 2022-05-31 NOTE — ASSESSMENT
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\par SPEP, IFx- normal\par WOrk up shows GERALD, low haptoglobin, MILLER negative\par s/p IV venofer 200 mg  x 5\par Feels better\par Repeat labs show persistent anemia\par Iron parameters better\par Hapto back to normal\par Given his dementia, comorbidities, PS- hold off bone marrow\par \par Elevated PSA\par Bone lesion/ weight loss\par PSMA PET/CT - focal prostate uptake, possibly primary malignancy\par Saw urology, now on ADT\par  \par Patient's wife (Lulu) did majority of the communication due to patients dementia\par She had multiple questions which were answered to satisfaction\par \par Labs in 2 weeks\par CBC, CMP, ferritin, iron studies, PSA\par OV few days later

## 2022-05-31 NOTE — RESULTS/DATA
HISTORY OF PRESENT ILLNESS    Presents with intermittent LLQ pains for several year(s). Feels is may be due to eating nuts, sugars. Episodes last 2-3 days. Had severe pain 11/2016 and CT scan was normal.    Last episode 3 weeks ago and he felt it was caused by sugar. He also went on liquid diet due to concern of diverticulitis. He is still doing this and feels LLQ is still pain full when he eats. Miralax helps, but he has a long, narrow stool. Uses sennakot at night. Has not had colonoscopy yet. He is moving bowels intermittently, last BM 4 day ago. Has lost weight due to his current diet. He is drinking fluids  Wt Readings from Last 3 Encounters:   02/03/17 178 lb (80.7 kg)   12/14/16 188 lb (85.3 kg)   11/30/16 181 lb 12.8 oz (82.5 kg)       Review of Systems   All other systems reviewed and are negative, except as noted in HPI    Past Medical and Surgical History   has a past medical history of Basal cell carcinoma of back (11/2013); Bell's palsy (1968, 2000); Dyslexia; Family history of skin cancer; Cornel's disease; VQDAUVMB(242.2) (9/9/13); Hypercholesteremia; Melanoma in situ ([facility] [address] (11/2013); Neck pain on right side (9/9/2013); Prostate enlargement; Skin cancer; Sun-damaged skin; Sunburn, blistering; and Umbilical hernia. He also has no past medical history of Arsenic suspected exposure; Radiation exposure; or Tanning bed exposure. has a past surgical history that includes orthopaedic (1953) and other surgical (11/26/13). reports that he has been smoking Cigarettes. He has been smoking about 0.10 packs per day. He has never used smokeless tobacco. He reports that he drinks about 8.0 oz of alcohol per week  He reports that he does not use illicit drugs. family history includes Cancer in his father; Heart Disease in his maternal grandmother; Heart Disease (age of onset: 80) in his maternal grandfather; Hypertension in his maternal grandmother.       Physical Exam   Nursing note and vitals reviewed. Blood pressure 141/81, pulse 62, temperature 98.5 °F (36.9 °C), resp. rate 12, height 5' 11\" (1.803 m), weight 178 lb (80.7 kg). Constitutional: In no distress. Eyes: Conjunctivae are normal.  HEENT:  No LAD or thyromegaly   Cardiovascular: Normal rate. regular rhythm. No murmurs  No edema  Pulmonary/Chest: Effort normal. clear to ausculation blaterally  Musculoskeletal:  no edema. Abd: soft, decreased bowel sounds, mild LLQ pain, no mass  Neurological: Alert and oriented. Grossly intact cranial nerves and motor function. Skin: No rash noted. Psychiatric: Normal mood and affect. Behavior is normal.     ASSESSMENT and PLAN  Nina Pace was seen today for constipation. Diagnoses and all orders for this visit:    Colicky LLQ abdominal pain  Constipation, unspecified constipation type  Needs colonoscopy - he will schedule today and let us know if any issues-  No clear evidence of diverticulitis  -     Re-start daily polyethylene glycol (MIRALAX) 17 gram/dose powder; Take 17 g by mouth daily. lab results and schedule of future lab studies reviewed with patient  reviewed medications and side effects in detail  Return to clinic for further evaluation if new symptoms develop or if current symptoms worsen or fail to resolve. [FreeTextEntry1] : Labs reviewed, analyzed and discussed\par

## 2022-06-07 ENCOUNTER — APPOINTMENT (OUTPATIENT)
Dept: FAMILY MEDICINE | Facility: CLINIC | Age: 87
End: 2022-06-07
Payer: MEDICARE

## 2022-06-07 VITALS
WEIGHT: 166 LBS | OXYGEN SATURATION: 99 % | SYSTOLIC BLOOD PRESSURE: 120 MMHG | BODY MASS INDEX: 23.24 KG/M2 | DIASTOLIC BLOOD PRESSURE: 70 MMHG | RESPIRATION RATE: 63 BRPM | HEIGHT: 71 IN | TEMPERATURE: 98.5 F

## 2022-06-07 DIAGNOSIS — Z79.01 LONG TERM (CURRENT) USE OF ANTICOAGULANTS: ICD-10-CM

## 2022-06-07 PROCEDURE — 99213 OFFICE O/P EST LOW 20 MIN: CPT | Mod: 25

## 2022-06-07 PROCEDURE — 36415 COLL VENOUS BLD VENIPUNCTURE: CPT

## 2022-06-07 RX ORDER — AZELASTINE HYDROCHLORIDE 137 UG/1
0.1 SPRAY, METERED NASAL TWICE DAILY
Qty: 1 | Refills: 2 | Status: ACTIVE | COMMUNITY
Start: 2022-06-07 | End: 1900-01-01

## 2022-06-07 NOTE — ASSESSMENT
[FreeTextEntry1] : Long-term anticoagulation\par Continue Eliquis due to A. fib history as well as a history of DVT\par Anemia with notable hemoglobin stable as of May 24\par CBC was given the patient has an order he has a blood work that will be drawn in July and should have blood work done and should follow-up sooner if there is any signs of bleeding or change in condition\par \par DVT\par No need for reevaluation with imaging patient is asymptomatic at this time currently on anticoagulation\par \par Hypertension is well controlled he continues to follow with cardiology\par \par Prostate cancer\par Following with oncology and with urology\par \par \par

## 2022-06-07 NOTE — HISTORY OF PRESENT ILLNESS
[de-identified] : 86-year-old male with a history of anemia, allergic rhinitis, A. fib, DVT, prostate cancer presents today for follow-up\par Liver lesions according to heme eval is likely hemangiomas no further work-up\par A. fib on Eliquis no bleeding is noted by the patient\par Prostate cancer -currently on Lupron-following with urology\par Patient is feeling well no shortness of breath headaches chest pain weakness\par

## 2022-07-26 LAB
PSA SERPL-MCNC: 2.52 NG/ML
TESTOST SERPL-MCNC: 22.8 NG/DL

## 2022-08-02 ENCOUNTER — RESULT REVIEW (OUTPATIENT)
Age: 87
End: 2022-08-02

## 2022-08-02 ENCOUNTER — APPOINTMENT (OUTPATIENT)
Dept: HEMATOLOGY ONCOLOGY | Facility: CLINIC | Age: 87
End: 2022-08-02

## 2022-08-02 VITALS
DIASTOLIC BLOOD PRESSURE: 80 MMHG | HEART RATE: 74 BPM | SYSTOLIC BLOOD PRESSURE: 166 MMHG | TEMPERATURE: 98.2 F | RESPIRATION RATE: 18 BRPM | OXYGEN SATURATION: 99 % | HEIGHT: 71 IN

## 2022-08-09 ENCOUNTER — APPOINTMENT (OUTPATIENT)
Dept: HEMATOLOGY ONCOLOGY | Facility: CLINIC | Age: 87
End: 2022-08-09

## 2022-08-09 VITALS
RESPIRATION RATE: 18 BRPM | HEIGHT: 71 IN | TEMPERATURE: 98.5 F | HEART RATE: 86 BPM | BODY MASS INDEX: 23.98 KG/M2 | WEIGHT: 171.25 LBS | DIASTOLIC BLOOD PRESSURE: 72 MMHG | OXYGEN SATURATION: 97 % | SYSTOLIC BLOOD PRESSURE: 172 MMHG

## 2022-08-09 PROCEDURE — 99214 OFFICE O/P EST MOD 30 MIN: CPT | Mod: 25

## 2022-08-09 RX ORDER — BICALUTAMIDE 50 MG/1
50 TABLET ORAL DAILY
Qty: 30 | Refills: 0 | Status: DISCONTINUED | COMMUNITY
Start: 2022-05-06 | End: 2022-08-09

## 2022-08-09 NOTE — ASSESSMENT
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 \par Given his dementia, comorbidities, PS- hold off bone marrow\par \par Overall doing with no new complaints.\par Labs reviewed, analyzed, and discussed\par no IV iron recommended. \par \par #Prostate cancer - follows by Dr. Quan\par Bone lesion/ weight loss\par PSMA PET/CT - focal prostate uptake, possibly primary malignancy\par on Casodex --> now Lupron every 3 months\par PSA trending down. \par  \par Patient's wife (Lulu) did majority of the communication due to patients dementia\par She had multiple questions which were answered to satisfaction\par \par rtc 4 months\par CBC, CMP, ferritin, iron studies, PSA\par

## 2022-08-09 NOTE — REVIEW OF SYSTEMS
[Recent Change In Weight] : ~T recent weight change [Vision Problems] : vision problems [Abdominal Pain] : abdominal pain [Constipation] : constipation [Dysuria] : dysuria [Muscle Pain] : muscle pain [Difficulty Walking] : difficulty walking [Negative] : Allergic/Immunologic [FreeTextEntry2] : -50lbs in one year [FreeTextEntry9] : walks with cane [de-identified] : mild cognitive impairment

## 2022-08-09 NOTE — PHYSICAL EXAM
[Capable of only limited self care, confined to bed or chair more than 50% of waking hours] : Status 3- Capable of only limited self care, confined to bed or chair more than 50% of waking hours [Normal] : normal spine exam without palpable tenderness, no kyphosis or scoliosis [de-identified] : Dementia

## 2022-08-09 NOTE — HISTORY OF PRESENT ILLNESS
[de-identified] : Mr. Ahmet Soto is 86 year old male with leukopenia and normocytic anemia here for consultation. Patient is referred by Dr. Still. Patient is accompanied by his wife. Patient has documented mild cognitive impairment but is able to respond to simple questions and commands. Patients wife is relaying most information for todays visit. Patients wife states that he has lost around 50lbs in one year, unintentionally. Patient with past medical history of A Fib on Eliquis and  normocytic anemia who was recently discharged at [facility] on 2/8/22 for acute intermittent bright red blood per rectum, guaiac-positive stools, and Hgb was 6.8. Patient received 2 units of PRBCS. EGD w/ Dr. Hooks unrevealing 2/4/22.  Colonoscopy 2/5/22 w/ multiple (9) polyps, diverticulosis, but no clear bleeding source. Eliquis discontinued at this time. \par \par 2/15/22 wbc 3.71 Hgb 8.5 hct 29.6 mcv 86.8 plt 191\par 2/4/22 Ferritin 30\par Leucopenia since 2019, hx of thrombocytopenia\par \par Social Hx:\par Smoke:  former smoker\par ETOH: None\par Illicit drug use: None\par \par Family Hx:\par Denies significant family history [de-identified] : Patient is seen today for follow up\par Accompanied by his wife\par  s/p  IV venofer 200 mg  x 5 in March 2022\par \par Overall doing well with no new complaints. \par Still receiving Lupron with Dr Kraus for prostate cancer\par \par

## 2022-08-15 ENCOUNTER — APPOINTMENT (OUTPATIENT)
Dept: UROLOGY | Facility: CLINIC | Age: 87
End: 2022-08-15

## 2022-08-15 VITALS
DIASTOLIC BLOOD PRESSURE: 85 MMHG | HEIGHT: 71 IN | SYSTOLIC BLOOD PRESSURE: 187 MMHG | WEIGHT: 171 LBS | HEART RATE: 88 BPM | BODY MASS INDEX: 23.94 KG/M2

## 2022-08-15 PROCEDURE — 99213 OFFICE O/P EST LOW 20 MIN: CPT

## 2022-08-16 NOTE — PHYSICAL EXAM
[General Appearance - In No Acute Distress] : no acute distress [Abdomen Soft] : soft [Skin Color & Pigmentation] : normal skin color and pigmentation [] : no respiratory distress

## 2022-08-16 NOTE — ASSESSMENT
[FreeTextEntry1] : Patient is a 86 year male with high risk high volume prostate cancer on ADT.  He is here for Lupron 84 day depot shot.  No interval hematuria or pain.  Voiding at baseline/content.\par \par A/P\par 1. prostate cancer\par 2. ADT \par \par PSA lianet slightly still in 2 range \par return 84 days repeat Lupron and PSA\par

## 2022-08-29 ENCOUNTER — APPOINTMENT (OUTPATIENT)
Dept: CARDIOLOGY | Facility: CLINIC | Age: 87
End: 2022-08-29

## 2022-09-06 ENCOUNTER — RX RENEWAL (OUTPATIENT)
Age: 87
End: 2022-09-06

## 2022-09-07 ENCOUNTER — APPOINTMENT (OUTPATIENT)
Dept: FAMILY MEDICINE | Facility: CLINIC | Age: 87
End: 2022-09-07

## 2022-09-07 VITALS
HEART RATE: 56 BPM | WEIGHT: 171 LBS | TEMPERATURE: 97.8 F | HEIGHT: 71 IN | OXYGEN SATURATION: 98 % | DIASTOLIC BLOOD PRESSURE: 80 MMHG | BODY MASS INDEX: 23.94 KG/M2 | SYSTOLIC BLOOD PRESSURE: 130 MMHG

## 2022-09-07 DIAGNOSIS — R06.89 OTHER ABNORMALITIES OF BREATHING: ICD-10-CM

## 2022-09-07 DIAGNOSIS — M89.9 DISORDER OF BONE, UNSPECIFIED: ICD-10-CM

## 2022-09-07 PROCEDURE — 99214 OFFICE O/P EST MOD 30 MIN: CPT

## 2022-09-07 NOTE — PHYSICAL EXAM
[Normal] : normal rate, regular rhythm, normal S1 and S2 and no murmur heard [No Edema] : there was no peripheral edema [de-identified] : Decreased lung sounds left lung base

## 2022-09-07 NOTE — HISTORY OF PRESENT ILLNESS
[de-identified] : 86 yr old male with a history of anemia, A. fib, BPH, prostate cancer, history of right leg DVT, presents today for follow-up\par \par Anemia \par - no dark or black stool \par -Patient continues on blood thinner-he has no symptoms at this time of shortness of breath\par -His hemoglobin has risen well from 10-12 in the last few months\par \par Hypertension\par -Blood pressure is well controlled\par \par \par \par

## 2022-09-28 ENCOUNTER — RX RENEWAL (OUTPATIENT)
Age: 87
End: 2022-09-28

## 2022-10-21 ENCOUNTER — RX RENEWAL (OUTPATIENT)
Age: 87
End: 2022-10-21

## 2022-11-07 ENCOUNTER — APPOINTMENT (OUTPATIENT)
Dept: UROLOGY | Facility: CLINIC | Age: 87
End: 2022-11-07

## 2022-11-07 VITALS
SYSTOLIC BLOOD PRESSURE: 147 MMHG | HEIGHT: 71 IN | WEIGHT: 171 LBS | DIASTOLIC BLOOD PRESSURE: 78 MMHG | BODY MASS INDEX: 23.94 KG/M2 | HEART RATE: 60 BPM

## 2022-11-07 PROCEDURE — 99213 OFFICE O/P EST LOW 20 MIN: CPT

## 2022-11-08 LAB — PSA SERPL-MCNC: 7.73 NG/ML

## 2022-11-10 NOTE — PHYSICAL EXAM
[General Appearance - Well Developed] : well developed [Normal Appearance] : normal appearance [General Appearance - In No Acute Distress] : no acute distress [Abdomen Soft] : soft [Costovertebral Angle Tenderness] : no ~M costovertebral angle tenderness [Skin Color & Pigmentation] : normal skin color and pigmentation [Edema] : no peripheral edema [] : no respiratory distress [Respiration, Rhythm And Depth] : normal respiratory rhythm and effort [Oriented To Time, Place, And Person] : oriented to person, place, and time [Normal Station and Gait] : the gait and station were normal for the patient's age

## 2022-11-10 NOTE — ASSESSMENT
[FreeTextEntry1] : Patient is a 87 year male with high grade/volume prostate cancer on hormone ablation therapy. 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  no interval new symptoms.  no modifying factors.\par he is seeing Oncology \par \par A/P\par 1. prostate cancer \par PSA if rising will refer to Oncology to discuss alternatives \par office 3 months for Lupron

## 2022-11-21 ENCOUNTER — RESULT REVIEW (OUTPATIENT)
Age: 87
End: 2022-11-21

## 2022-11-21 ENCOUNTER — APPOINTMENT (OUTPATIENT)
Dept: HEMATOLOGY ONCOLOGY | Facility: CLINIC | Age: 87
End: 2022-11-21

## 2022-11-21 VITALS
HEIGHT: 71 IN | TEMPERATURE: 97.3 F | OXYGEN SATURATION: 99 % | HEART RATE: 55 BPM | DIASTOLIC BLOOD PRESSURE: 83 MMHG | BODY MASS INDEX: 25.54 KG/M2 | WEIGHT: 182.4 LBS | SYSTOLIC BLOOD PRESSURE: 171 MMHG | RESPIRATION RATE: 16 BRPM

## 2022-11-23 ENCOUNTER — APPOINTMENT (OUTPATIENT)
Dept: HEMATOLOGY ONCOLOGY | Facility: CLINIC | Age: 87
End: 2022-11-23

## 2022-12-01 ENCOUNTER — APPOINTMENT (OUTPATIENT)
Dept: HEMATOLOGY ONCOLOGY | Facility: CLINIC | Age: 87
End: 2022-12-01

## 2022-12-01 VITALS
HEART RATE: 68 BPM | HEIGHT: 71 IN | RESPIRATION RATE: 16 BRPM | OXYGEN SATURATION: 98 % | WEIGHT: 181.3 LBS | SYSTOLIC BLOOD PRESSURE: 179 MMHG | DIASTOLIC BLOOD PRESSURE: 92 MMHG | BODY MASS INDEX: 25.38 KG/M2 | TEMPERATURE: 96 F

## 2022-12-01 DIAGNOSIS — D61.818 OTHER PANCYTOPENIA: ICD-10-CM

## 2022-12-01 PROCEDURE — 36415 COLL VENOUS BLD VENIPUNCTURE: CPT

## 2022-12-01 PROCEDURE — 99214 OFFICE O/P EST MOD 30 MIN: CPT | Mod: 25

## 2022-12-01 NOTE — HISTORY OF PRESENT ILLNESS
[de-identified] : Mr. Ahmet Soto is 86 year old male with leukopenia and normocytic anemia here for consultation. Patient is referred by Dr. Still. Patient is accompanied by his wife. Patient has documented mild cognitive impairment but is able to respond to simple questions and commands. Patients wife is relaying most information for todays visit. Patients wife states that he has lost around 50lbs in one year, unintentionally. Patient with past medical history of A Fib on Eliquis and  normocytic anemia who was recently discharged at [facility] on 2/8/22 for acute intermittent bright red blood per rectum, guaiac-positive stools, and Hgb was 6.8. Patient received 2 units of PRBCS. EGD w/ Dr. Hooks unrevealing 2/4/22.  Colonoscopy 2/5/22 w/ multiple (9) polyps, diverticulosis, but no clear bleeding source. Eliquis discontinued at this time. \par \par 2/15/22 wbc 3.71 Hgb 8.5 hct 29.6 mcv 86.8 plt 191\par 2/4/22 Ferritin 30\par Leucopenia since 2019, hx of thrombocytopenia\par \par Social Hx:\par Smoke:  former smoker\par ETOH: None\par Illicit drug use: None\par \par Family Hx:\par Denies significant family history [de-identified] : Patient is seen today for follow up\par Accompanied by his wife\par  s/p  IV venofer 200 mg  x 5 in March 2022\par \par He is doing well\par takes iron tablets daily \par Was seen by urologist Dr. Kraus on 11/7/22 and received this 3 month lupron then. \par

## 2022-12-01 NOTE — PHYSICAL EXAM
[Capable of only limited self care, confined to bed or chair more than 50% of waking hours] : Status 3- Capable of only limited self care, confined to bed or chair more than 50% of waking hours [Normal] : normal spine exam without palpable tenderness, no kyphosis or scoliosis [de-identified] : Dementia

## 2022-12-01 NOTE — ASSESSMENT
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 \par Given his dementia, comorbidities, PS - patient and wife wants to hold off bone marrow. \par Pancytopenia slightly improved \par Labs reviewed, analyzed, and discussed \par continue with iron tablet. \par \par #Prostate cancer - follows by Dr. Quan\par Cruz score 4+5 = 9, grade group 5\par Very high risk \par Bone lesion/ weight loss\par PSMA PET/CT - focal prostate uptake, possibly primary malignancy\par on Casodex --> Lupron q3 months - last dose 11/7/22\par PSA - 2.96--> 7.92 - doubling within 6 months - advised to get another Pet/CT scan. \par Next step of care pending with Pet/Ct scan result. \par \par Patient's wife (Lulu) did majority of the communication due to patients dementia\par She had multiple questions which were answered to satisfaction\par \par d/w Dr. Bañuelos \par rtc 3 weeks to review scan \par CBC, CMP, ferritin, iron studies, PSA\par

## 2022-12-01 NOTE — REVIEW OF SYSTEMS
[Recent Change In Weight] : ~T recent weight change [Vision Problems] : vision problems [Abdominal Pain] : abdominal pain [Constipation] : constipation [Dysuria] : dysuria [Muscle Pain] : muscle pain [Difficulty Walking] : difficulty walking [Negative] : Allergic/Immunologic [FreeTextEntry2] : 10 point review of systems negative except as outlined in HPI [FreeTextEntry9] : walks with cane [de-identified] : mild cognitive impairment

## 2022-12-06 ENCOUNTER — APPOINTMENT (OUTPATIENT)
Dept: HEMATOLOGY ONCOLOGY | Facility: CLINIC | Age: 87
End: 2022-12-06

## 2022-12-13 ENCOUNTER — NON-APPOINTMENT (OUTPATIENT)
Age: 87
End: 2022-12-13

## 2022-12-13 ENCOUNTER — APPOINTMENT (OUTPATIENT)
Dept: CARDIOLOGY | Facility: CLINIC | Age: 87
End: 2022-12-13

## 2022-12-13 ENCOUNTER — APPOINTMENT (OUTPATIENT)
Dept: HEMATOLOGY ONCOLOGY | Facility: CLINIC | Age: 87
End: 2022-12-13

## 2022-12-13 VITALS
SYSTOLIC BLOOD PRESSURE: 146 MMHG | BODY MASS INDEX: 44.1 KG/M2 | TEMPERATURE: 97.4 F | HEART RATE: 84 BPM | RESPIRATION RATE: 16 BRPM | WEIGHT: 315 LBS | OXYGEN SATURATION: 98 % | DIASTOLIC BLOOD PRESSURE: 84 MMHG | HEIGHT: 71 IN

## 2022-12-13 DIAGNOSIS — R60.9 EDEMA, UNSPECIFIED: ICD-10-CM

## 2022-12-13 PROCEDURE — 99214 OFFICE O/P EST MOD 30 MIN: CPT

## 2022-12-13 PROCEDURE — 93000 ELECTROCARDIOGRAM COMPLETE: CPT

## 2022-12-13 NOTE — REASON FOR VISIT
[FreeTextEntry1] : The patient returns for follow-up today.  I have followed him for many years with the principal cardiac diagnoses of chronic atrial fibrillation and valvular heart disease.  The patient's cardiac status has been well compensated with medical treatment.  Several months ago the patient developed a very severe anemia and required emergency transfusions.  He has been followed by the hematology service.  The patient's clinical condition appears significantly improved compared to his last visit to my office.  He states that he is feeling fine with no symptoms of acute chest pain or shortness of breath.  There has been no dizziness lightheadedness or palpitations.\par Etiology of the patient's anemia was never firmly established.  It was assumed that he had been bleeding from some occult site.  However a complete evaluation with Dr. Hooks including upper and lower endoscopy was unrevealing there were multiple polyps removed at the time of the colonoscopy but no clear bleeding source was identified.  He had been off anticoagulation for a period of time but Eliquis has since been resumed.\par Most recent note from hematology was December 1.

## 2022-12-13 NOTE — DISCUSSION/SUMMARY
[FreeTextEntry1] : Very pleased with the patient's progress compared to his last visits to the office.  At that time I was very concerned about his severe anemia.  Anemia has largely resolved and an exact etiology was never determined.  The patient's hemoglobin has improved to 12.8 on a recent determination.  Also low white count and platelet count have improved.  Presumed cause of the anemia was occult bleeding was never elucidated.  The patient did have a complete work-up with Dr. Hooks including upper and lower endoscopy.\par Patient does have valvular heart disease (see previous echocardiogram) however this has been stable with no evidence of overt CHF.\par The patient has chronic atrial fibrillation.  He has been maintained on Eliquis at full dosage of 5 mg twice daily he does qualify for this dosage based on his weight and normal renal function.  However I have asked the patient and his wife to be aware that this is a an anticoagulant and that the patient may be at risk of bleeding.\par \par Today's electrocardiogram reveals atrial fibrillation with controlled ventricular rate and no acute changes are noted\par \par Based on my evaluation and assessment I believe the patient's clinical condition is improved and his cardiac status is stable.  We will have the patient back in 6 months.\harry Is now seeing Dr. Still for primary care.

## 2022-12-13 NOTE — CARDIOLOGY SUMMARY
[de-identified] : Echocardiogram July 6, 2021 normal LV dimensions and contraction pattern normal RV contraction pattern severely dilated LA severely dilated RA moderate mitral regurg moderate to severe TR estimated PA pressure 35 thickened aortic valve leaflets mild to moderate aortic regurg compared to 520 similar findings. [de-identified] : Recent labs done in December 6.  White count 4.17 hemoglobin 12.8 hematocrit 39.9 platelets 135 hemoglobin all lineages are increased compared to a previous lab in August.

## 2022-12-13 NOTE — PHYSICAL EXAM
[Well Developed] : well developed [Well Nourished] : well nourished [No Acute Distress] : no acute distress [Normal Conjunctiva] : normal conjunctiva [Normal Venous Pressure] : normal venous pressure [No Carotid Bruit] : no carotid bruit [Normal S1, S2] : normal S1, S2 [No Rub] : no rub [No Gallop] : no gallop [Clear Lung Fields] : clear lung fields [Good Air Entry] : good air entry [No Respiratory Distress] : no respiratory distress  [Soft] : abdomen soft [Non Tender] : non-tender [No Masses/organomegaly] : no masses/organomegaly [Normal Bowel Sounds] : normal bowel sounds [Normal Gait] : normal gait [No Edema] : no edema [No Cyanosis] : no cyanosis [No Clubbing] : no clubbing [No Varicosities] : no varicosities [No Rash] : no rash [No Skin Lesions] : no skin lesions [Moves all extremities] : moves all extremities [No Focal Deficits] : no focal deficits [Normal Speech] : normal speech [Alert and Oriented] : alert and oriented [Normal memory] : normal memory [de-identified] : Blowing grade 2 systolic murmur at apex. [de-identified] : Chronic venous stasis changes edema has resolved.

## 2022-12-13 NOTE — HISTORY OF PRESENT ILLNESS
[FreeTextEntry1] : The patient is being treated by a urologist for advanced prostate cancer.  He does receive Lupron injections.

## 2022-12-16 ENCOUNTER — APPOINTMENT (OUTPATIENT)
Dept: FAMILY MEDICINE | Facility: CLINIC | Age: 87
End: 2022-12-16

## 2022-12-16 VITALS
OXYGEN SATURATION: 98 % | BODY MASS INDEX: 24.64 KG/M2 | TEMPERATURE: 97.8 F | DIASTOLIC BLOOD PRESSURE: 80 MMHG | WEIGHT: 176 LBS | SYSTOLIC BLOOD PRESSURE: 126 MMHG | HEIGHT: 71 IN | HEART RATE: 80 BPM

## 2022-12-16 PROCEDURE — G0008: CPT

## 2022-12-16 PROCEDURE — 99213 OFFICE O/P EST LOW 20 MIN: CPT | Mod: 25

## 2022-12-16 PROCEDURE — 90662 IIV NO PRSV INCREASED AG IM: CPT

## 2022-12-22 ENCOUNTER — RESULT REVIEW (OUTPATIENT)
Age: 87
End: 2022-12-22

## 2022-12-23 NOTE — ASSESSMENT
[FreeTextEntry1] : Essential hypertension\par -Hypertension stable well-controlled at this time\par \par Prostate cancer and history of DVT\par Currently on Eliquis for A. fib continue\par No leg pain at this time\par Continue current management with hematology oncology\par Follows with urology of note PSA has been rising\par \par A. fib\par Recently seen by cardiology continue to monitor\par \par Anemia previously recorded secondary to GI bleed\par At this time stable no change

## 2022-12-23 NOTE — PHYSICAL EXAM
[Normal] : normal rate, regular rhythm, normal S1 and S2 and no murmur heard [No Edema] : there was no peripheral edema [de-identified] : Irregular rhythm

## 2022-12-23 NOTE — HISTORY OF PRESENT ILLNESS
[de-identified] : 87-year-old male with a history of atrial fibrillation,BPH prostate cancer, hypertension, history of DVT presents today for follow-up\par \par Patient is overall doing well he and his partner note that he is not having any concerns at this time\par \par History of mild cognitive impairment-doing well at this time no worsening in condition noted by patient\par \par Hypertension has been well controlled on his current management\par

## 2022-12-28 DIAGNOSIS — R97.20 ELEVATED PROSTATE, SPECIFIC ANTIGEN [PSA]: ICD-10-CM

## 2023-01-05 ENCOUNTER — APPOINTMENT (OUTPATIENT)
Dept: HEMATOLOGY ONCOLOGY | Facility: CLINIC | Age: 88
End: 2023-01-05
Payer: MEDICARE

## 2023-01-05 ENCOUNTER — RESULT REVIEW (OUTPATIENT)
Age: 88
End: 2023-01-05

## 2023-01-05 VITALS
HEART RATE: 85 BPM | TEMPERATURE: 97.1 F | BODY MASS INDEX: 24.7 KG/M2 | WEIGHT: 180.38 LBS | OXYGEN SATURATION: 97 % | SYSTOLIC BLOOD PRESSURE: 153 MMHG | DIASTOLIC BLOOD PRESSURE: 87 MMHG | RESPIRATION RATE: 18 BRPM | HEIGHT: 71.5 IN

## 2023-01-05 DIAGNOSIS — D50.9 IRON DEFICIENCY ANEMIA, UNSPECIFIED: ICD-10-CM

## 2023-01-05 PROCEDURE — 99214 OFFICE O/P EST MOD 30 MIN: CPT | Mod: 25

## 2023-01-05 PROCEDURE — 36415 COLL VENOUS BLD VENIPUNCTURE: CPT

## 2023-01-05 NOTE — REVIEW OF SYSTEMS
[FreeTextEntry2] : 10 point review of systems negative except as outlined in HPI [FreeTextEntry9] : walks with cane [de-identified] : mild cognitive impairment

## 2023-01-05 NOTE — ASSESSMENT
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 \par Given his dementia, comorbidities, PS - patient and wife wants to hold off bone marrow. \par -Labs are drawn in the office, reviewed, analyzed, and discussed\par repeated labs with normal WBCs and PLTs - H/H are stable. \par Continue to monitor. \par \par #Prostate cancer - follows by Dr. Quan\par Cruz score 4+5 = 9, grade group 5\par Very high risk \par 5/2022 bone scan - no mets. \par PSMA PET/CT - focal prostate uptake\par repeated Pet/CT PSMA - focal uptake and new perirectal lymph node - Will obtain CD from [facility] to have it compare. \par on Casodex --> Lupron q3 months - last dose 11/7/22\par PSA - 2.96--> 7.92 - doubling within 6 months\par Explained to patient and spouse that despite new Pet/CT not compared to prior scan, there's no clear indicative that his cancer is aggressively progression and given his poor performance status and dementia, we will lean toward conservative treatment. We'll continue with [id number] lupron for now and closely monitor PSA. Patient and spouse agreed with treatment plan. \par \par Patient's wife (Lulu) did majority of the communication due to patients dementia\par She had multiple questions which were answered to satisfaction\par \par d/w Dr. Bañuelos \par rtc 4-6 weeks \par CBC, CMP, ferritin, iron studies, PSA\par

## 2023-01-05 NOTE — PHYSICAL EXAM
[Capable of only limited self care, confined to bed or chair more than 50% of waking hours] : Status 3- Capable of only limited self care, confined to bed or chair more than 50% of waking hours [Normal] : normal spine exam without palpable tenderness, no kyphosis or scoliosis [de-identified] : Dementia

## 2023-01-05 NOTE — HISTORY OF PRESENT ILLNESS
[de-identified] : Mr. Ahmet Soto is 86 year old male with leukopenia and normocytic anemia here for consultation. Patient is referred by Dr. Still. Patient is accompanied by his wife. Patient has documented mild cognitive impairment but is able to respond to simple questions and commands. Patients wife is relaying most information for todays visit. Patients wife states that he has lost around 50lbs in one year, unintentionally. Patient with past medical history of A Fib on Eliquis and  normocytic anemia who was recently discharged at [facility] on 2/8/22 for acute intermittent bright red blood per rectum, guaiac-positive stools, and Hgb was 6.8. Patient received 2 units of PRBCS. EGD w/ Dr. Hooks unrevealing 2/4/22.  Colonoscopy 2/5/22 w/ multiple (9) polyps, diverticulosis, but no clear bleeding source. Eliquis discontinued at this time. \par \par 2/15/22 wbc 3.71 Hgb 8.5 hct 29.6 mcv 86.8 plt 191\par 2/4/22 Ferritin 30\par Leucopenia since 2019, hx of thrombocytopenia\par \par Social Hx:\par Smoke:  former smoker\par ETOH: None\par Illicit drug use: None\par \par Family Hx:\par Denies significant family history [de-identified] : Patient is seen today for follow up\par Accompanied by his wife\par  s/p  IV venofer 200 mg  x 5 in March 2022\par \par Patient is doing well with no new complaints. \par \par 12/22/22 Pet/CT (not compared to prior scan)\par Intense focal activity is seen in the right posterior aspect of the prostate without obvious abnormality on the noncontrast CT scan.  Maximum SUV in this area is 18.8.\par A single 5 x 7 mm perirectal lymph node is also seen which has focal FDG uptake with a maximum SUV of 6.9.\par

## 2023-01-19 ENCOUNTER — APPOINTMENT (OUTPATIENT)
Dept: FAMILY MEDICINE | Facility: CLINIC | Age: 88
End: 2023-01-19
Payer: MEDICARE

## 2023-01-19 VITALS
BODY MASS INDEX: 25.2 KG/M2 | HEART RATE: 83 BPM | WEIGHT: 180 LBS | OXYGEN SATURATION: 97 % | HEIGHT: 71 IN | DIASTOLIC BLOOD PRESSURE: 80 MMHG | SYSTOLIC BLOOD PRESSURE: 150 MMHG | TEMPERATURE: 97.8 F

## 2023-01-19 PROCEDURE — 36415 COLL VENOUS BLD VENIPUNCTURE: CPT

## 2023-01-19 PROCEDURE — 99214 OFFICE O/P EST MOD 30 MIN: CPT | Mod: 25

## 2023-01-19 NOTE — HISTORY OF PRESENT ILLNESS
[Post-hospitalization from ___ Hospital] : Post-hospitalization from [unfilled] Hospital [Discharge Summary] : discharge summary [Pertinent Labs] : pertinent labs [Radiology Findings] : radiology findings [Discharge Med List] : discharge medication list [Med Reconciliation] : medication reconciliation has been completed [FreeTextEntry2] : 87 year of age M presents for post hospital discharge for bleeding in nostrils. \par Patient was seen in the emergency room and was not admitted due to bloody nose\par The patient had packing placed was evaluated in the ER and removal of a clot noted no specific findings\par Packing was removed by ENT few days ago and no significant findings were noted\par He has not had bleeding since\par Concern today is primarily fatigue which may be related to blood loss patient has a chronic history of anemia\par No shortness of breath nausea vomiting

## 2023-01-20 LAB
BASOPHILS # BLD AUTO: 0.05 K/UL
BASOPHILS NFR BLD AUTO: 1 %
EOSINOPHIL # BLD AUTO: 0.18 K/UL
EOSINOPHIL NFR BLD AUTO: 3.5 %
FERRITIN SERPL-MCNC: 351 NG/ML
HCT VFR BLD CALC: 30.5 %
HGB BLD-MCNC: 9.7 G/DL
IMM GRANULOCYTES NFR BLD AUTO: 0.6 %
IRON SATN MFR SERPL: 25 %
IRON SERPL-MCNC: 50 UG/DL
LYMPHOCYTES # BLD AUTO: 1.28 K/UL
LYMPHOCYTES NFR BLD AUTO: 25 %
MAN DIFF?: NORMAL
MCHC RBC-ENTMCNC: 31.2 PG
MCHC RBC-ENTMCNC: 31.8 GM/DL
MCV RBC AUTO: 98.1 FL
MONOCYTES # BLD AUTO: 0.39 K/UL
MONOCYTES NFR BLD AUTO: 7.6 %
NEUTROPHILS # BLD AUTO: 3.18 K/UL
NEUTROPHILS NFR BLD AUTO: 62.3 %
PLATELET # BLD AUTO: 190 K/UL
RBC # BLD: 3.11 M/UL
RBC # FLD: 14.3 %
TIBC SERPL-MCNC: 203 UG/DL
UIBC SERPL-MCNC: 153 UG/DL
WBC # FLD AUTO: 5.11 K/UL

## 2023-01-24 ENCOUNTER — NON-APPOINTMENT (OUTPATIENT)
Age: 88
End: 2023-01-24

## 2023-01-30 ENCOUNTER — APPOINTMENT (OUTPATIENT)
Dept: UROLOGY | Facility: CLINIC | Age: 88
End: 2023-01-30
Payer: MEDICARE

## 2023-01-30 VITALS
DIASTOLIC BLOOD PRESSURE: 69 MMHG | BODY MASS INDEX: 25.2 KG/M2 | WEIGHT: 180 LBS | SYSTOLIC BLOOD PRESSURE: 150 MMHG | HEART RATE: 71 BPM | HEIGHT: 71 IN

## 2023-01-30 PROCEDURE — 99213 OFFICE O/P EST LOW 20 MIN: CPT

## 2023-01-30 NOTE — ASSESSMENT
[FreeTextEntry1] : Patient is a 87 year male with high grade high volume prostate cancer on Lupron 12 week depot.  His PSA was rising and was 7.92 in 11/22 but repeat PSA down to 6.86 1/5/23.\par no new bony pain or symptoms.  no modifying factors.\par \par A/P\par 1. prostate cancer \par since PSA has dropped and no new issues will observe \par Lupron 12 week depot given office in 12 weeks for Lupron\par PSA prior

## 2023-01-30 NOTE — PHYSICAL EXAM
[General Appearance - Well Developed] : well developed [Normal Appearance] : normal appearance [General Appearance - In No Acute Distress] : no acute distress [Abdomen Soft] : soft [Costovertebral Angle Tenderness] : no ~M costovertebral angle tenderness [Skin Color & Pigmentation] : normal skin color and pigmentation [] : no respiratory distress [Respiration, Rhythm And Depth] : normal respiratory rhythm and effort [Oriented To Time, Place, And Person] : oriented to person, place, and time

## 2023-02-23 ENCOUNTER — RESULT REVIEW (OUTPATIENT)
Age: 88
End: 2023-02-23

## 2023-02-23 ENCOUNTER — APPOINTMENT (OUTPATIENT)
Dept: HEMATOLOGY ONCOLOGY | Facility: CLINIC | Age: 88
End: 2023-02-23

## 2023-02-23 VITALS
WEIGHT: 177.2 LBS | RESPIRATION RATE: 16 BRPM | HEIGHT: 71 IN | BODY MASS INDEX: 24.81 KG/M2 | OXYGEN SATURATION: 98 % | TEMPERATURE: 97.9 F | SYSTOLIC BLOOD PRESSURE: 157 MMHG | DIASTOLIC BLOOD PRESSURE: 73 MMHG | HEART RATE: 66 BPM

## 2023-02-24 ENCOUNTER — APPOINTMENT (OUTPATIENT)
Dept: PODIATRY | Facility: CLINIC | Age: 88
End: 2023-02-24
Payer: MEDICARE

## 2023-02-24 VITALS — BODY MASS INDEX: 24.78 KG/M2 | HEIGHT: 71 IN | WEIGHT: 177 LBS

## 2023-02-24 PROCEDURE — 11721 DEBRIDE NAIL 6 OR MORE: CPT

## 2023-02-24 PROCEDURE — 99203 OFFICE O/P NEW LOW 30 MIN: CPT | Mod: 25

## 2023-02-24 NOTE — HISTORY OF PRESENT ILLNESS
[FreeTextEntry1] : Location: both feet\par Duration:many years\par Etiology: long thick nails\par Past Tx: self, family\par

## 2023-02-24 NOTE — PHYSICAL EXAM
[General Appearance - Alert] : alert [General Appearance - In No Acute Distress] : in no acute distress [Sensation] : the sensory exam was normal to light touch and pinprick [No Focal Deficits] : no focal deficits [Deep Tendon Reflexes (DTR)] : deep tendon reflexes were 2+ and symmetric [Motor Exam] : the motor exam was normal [Oriented To Time, Place, And Person] : oriented to person, place, and time [Impaired Insight] : insight and judgment were intact [Affect] : the affect was normal [de-identified] : overall muscle strength testing is decreased, but consistant with the patients age and medical problems. Mild atrophy and overall weakness present.\par  [FreeTextEntry1] : The patient has all contributing factors to onychomycosis including but not limited to thickness, subungual debris, discoloration and partial lysis and they are brittle when cut.\par Dermatologic exam reveals no significant findings. No sign of subcutaneous nodules skin lesions or ulcers. Webspaces are clear there are no sign of infection cellulitis or erythema.\par

## 2023-02-24 NOTE — REVIEW OF SYSTEMS
[Arthralgias] : arthralgias [As Noted in HPI] : as noted in HPI [Negative] : Heme/Lymph [Skin Lesions] : no skin lesions [Skin Wound] : no skin wound

## 2023-02-24 NOTE — PROCEDURE
[FreeTextEntry1] : I had a lengthy discussion with the patient regarding the way they should be cutting a nail in an effort to help prevent recurrence of this problem. I also revised self treatment should not be attempted and should there be any redness or pain on the side of the nail rather than treating it themselves they should call the office to make a follow up.\par I have had a lengthy discussion with the patient regarding overall skincare. The importance of the type of socks, the type of shoes, and the type of overall foot hygiene is important to help control and prevent eruptions especially over extreme weather changes. This included but was not limited to hydration and lubrication, dove soap, triple rinse clothing and linens. I also explained the importance of thorough drying of both feet especially the web spaces. Given the extreme temperatures back in a car I also reviewed the type of shoes that would help reduce the chances of cracking of the skin especially leading to fissuring of the heels. Overall skincare precautions were reviewed education literature dispensed in the patient's questions asked and answered appropriately.\par Using sterile instrumentation debridement of all nails manually and electrically to decrease thickness, pain and girth and make shoe gear more comfortable with "slant back" procedure of any bordering spicules causing pain\par \par I lengthy discussion with the patient today regarding hygiene and foot health. My discussion to focus mainly on prevention of pathology and remaining proactive. My discussion included but was not limited to overall foot health, foot hygiene, the risks of walking barefoot especially in public places and the need to be conscious of the cleanliness of facilities where not only in a walk barefoot but other people walk barefoot as well. All questions were asked and answered appropriately and educational literature was dispensed\par follow up prn\par

## 2023-03-02 ENCOUNTER — APPOINTMENT (OUTPATIENT)
Dept: HEMATOLOGY ONCOLOGY | Facility: CLINIC | Age: 88
End: 2023-03-02
Payer: MEDICARE

## 2023-03-02 VITALS
HEART RATE: 58 BPM | TEMPERATURE: 96.9 F | RESPIRATION RATE: 16 BRPM | DIASTOLIC BLOOD PRESSURE: 68 MMHG | BODY MASS INDEX: 24.89 KG/M2 | HEIGHT: 71 IN | WEIGHT: 177.8 LBS | OXYGEN SATURATION: 98 % | SYSTOLIC BLOOD PRESSURE: 145 MMHG

## 2023-03-02 PROCEDURE — 99214 OFFICE O/P EST MOD 30 MIN: CPT | Mod: 25

## 2023-03-02 NOTE — REVIEW OF SYSTEMS
[FreeTextEntry2] : 10 point review of systems negative except as outlined in HPI [FreeTextEntry9] : walks with cane [de-identified] : mild cognitive impairment

## 2023-03-02 NOTE — ASSESSMENT
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 \par Given his dementia, comorbidities, PS - patient and wife wants to hold off bone marrow. \par Labs reviewed, analyzed, and discussed\par Ferritin acceptable with stable WBC, Hgb, and plts. \par  continue to closely monitor for now. \par \par #Prostate cancer - follows by Dr. Quan\par Com[place]io score 4+5 = 9, grade group 5\par Very high risk \par 5/2022 bone scan - no mets. \par PSMA PET/CT - focal prostate uptake\par repeated Pet/CT PSMA - focal uptake and new perirectal lymph node \par on Casodex --> Lupron q3 months - last dose 1/30/2023 with Dr. Kraus\par PSA - 2.96--> 7.92 - > 6.86--> 8.90\par PSA pretty much stable and he's clinically doing well\par continue Lupron q3 months and if new symptoms arise or PSA greatly increasing, we'll consider starting treatment with Zytiga/Xtandi + Lupron. \par Patient and spouse agreed on closely monitoring for now due to his demential and poor PS. \par \par Patient's wife (Lulu) did majority of the communication due to patients dementia\par She had multiple questions which were answered to satisfaction\par \par d/w Dr. Bañuelos \par rtc 8 months  \par CBC, CMP, ferritin, iron studies, PSA\par

## 2023-03-02 NOTE — PHYSICAL EXAM
[Capable of only limited self care, confined to bed or chair more than 50% of waking hours] : Status 3- Capable of only limited self care, confined to bed or chair more than 50% of waking hours [Normal] : normal spine exam without palpable tenderness, no kyphosis or scoliosis [de-identified] : Dementia

## 2023-03-02 NOTE — HISTORY OF PRESENT ILLNESS
[de-identified] : Mr. Ahmet Soto is 86 year old male with leukopenia and normocytic anemia here for consultation. Patient is referred by Dr. Still. Patient is accompanied by his wife. Patient has documented mild cognitive impairment but is able to respond to simple questions and commands. Patients wife is relaying most information for todays visit. Patients wife states that he has lost around 50lbs in one year, unintentionally. Patient with past medical history of A Fib on Eliquis and  normocytic anemia who was recently discharged at [facility] on 2/8/22 for acute intermittent bright red blood per rectum, guaiac-positive stools, and Hgb was 6.8. Patient received 2 units of PRBCS. EGD w/ Dr. Hooks unrevealing 2/4/22.  Colonoscopy 2/5/22 w/ multiple (9) polyps, diverticulosis, but no clear bleeding source. Eliquis discontinued at this time. \par \par 2/15/22 wbc 3.71 Hgb 8.5 hct 29.6 mcv 86.8 plt 191\par 2/4/22 Ferritin 30\par Leucopenia since 2019, hx of thrombocytopenia\par \par Social Hx:\par Smoke:  former smoker\par ETOH: None\par Illicit drug use: None\par \par Family Hx:\par Denies significant family history [de-identified] : Patient is seen today for follow up\par Accompanied by his wife\par  s/p  IV venofer 200 mg  x 5 in March 2022\par \par Patient is doing well with no new complaints. \par \par 12/22/22 Pet/CT (not compared to prior scan)\par Intense focal activity is seen in the right posterior aspect of the prostate without obvious abnormality on the noncontrast CT scan.  Maximum SUV in this area is 18.8.\par A single 5 x 7 mm perirectal lymph node is also seen which has focal FDG uptake with a maximum SUV of 6.9.\par

## 2023-03-27 ENCOUNTER — RX RENEWAL (OUTPATIENT)
Age: 88
End: 2023-03-27

## 2023-04-14 ENCOUNTER — RESULT REVIEW (OUTPATIENT)
Age: 88
End: 2023-04-14

## 2023-04-14 ENCOUNTER — APPOINTMENT (OUTPATIENT)
Dept: HEMATOLOGY ONCOLOGY | Facility: CLINIC | Age: 88
End: 2023-04-14

## 2023-04-14 VITALS
DIASTOLIC BLOOD PRESSURE: 82 MMHG | OXYGEN SATURATION: 98 % | HEART RATE: 71 BPM | TEMPERATURE: 96.7 F | HEIGHT: 71 IN | WEIGHT: 181.13 LBS | SYSTOLIC BLOOD PRESSURE: 165 MMHG | RESPIRATION RATE: 16 BRPM | BODY MASS INDEX: 25.36 KG/M2

## 2023-04-24 ENCOUNTER — APPOINTMENT (OUTPATIENT)
Dept: UROLOGY | Facility: CLINIC | Age: 88
End: 2023-04-24
Payer: MEDICARE

## 2023-04-24 VITALS
DIASTOLIC BLOOD PRESSURE: 73 MMHG | WEIGHT: 181 LBS | BODY MASS INDEX: 25.34 KG/M2 | HEIGHT: 71 IN | HEART RATE: 64 BPM | SYSTOLIC BLOOD PRESSURE: 131 MMHG

## 2023-04-24 PROCEDURE — 99213 OFFICE O/P EST LOW 20 MIN: CPT

## 2023-04-24 NOTE — PHYSICAL EXAM
[General Appearance - Well Developed] : well developed [Normal Appearance] : normal appearance [General Appearance - In No Acute Distress] : no acute distress [Abdomen Soft] : soft [Costovertebral Angle Tenderness] : no ~M costovertebral angle tenderness [] : no respiratory distress [Respiration, Rhythm And Depth] : normal respiratory rhythm and effort [FreeTextEntry1] : wheelchair

## 2023-04-24 NOTE — ASSESSMENT
[FreeTextEntry1] : Patient is a 87 year male who presents with prostate cancer on ADT.  PSA 10 in 4/23 up from 8.6.  No new bony pain, hematuria or voiding symptoms.  Very comfortable.\par Saw Oncology who is observing at this time.\par \par A/P\par 1. prostate cancer\par 2. rising PSA on ADT\par 3. dementia\par \par 12 week Lupron depot given today \par office in 12 weeks\par PSA at that time

## 2023-05-10 ENCOUNTER — NON-APPOINTMENT (OUTPATIENT)
Age: 88
End: 2023-05-10

## 2023-05-22 ENCOUNTER — APPOINTMENT (OUTPATIENT)
Dept: PODIATRY | Facility: CLINIC | Age: 88
End: 2023-05-22
Payer: MEDICARE

## 2023-05-22 VITALS — BODY MASS INDEX: 25.34 KG/M2 | HEIGHT: 71 IN | WEIGHT: 181 LBS

## 2023-05-22 PROCEDURE — 11721 DEBRIDE NAIL 6 OR MORE: CPT

## 2023-05-22 NOTE — PHYSICAL EXAM
[General Appearance - Alert] : alert [General Appearance - In No Acute Distress] : in no acute distress [de-identified] : \par  [FreeTextEntry1] : The patient has all contributing factors to onychomycosis including but not limited to thickness, subungual debris, discoloration and partial lysis and they are brittle when cut.\par Dermatologic exam reveals no significant findings. No sign of subcutaneous nodules skin lesions or ulcers. Webspaces are clear there are no sign of infection cellulitis or erythema.\par  [Deep Tendon Reflexes (DTR)] : deep tendon reflexes were 2+ and symmetric

## 2023-06-08 ENCOUNTER — APPOINTMENT (OUTPATIENT)
Dept: HEMATOLOGY ONCOLOGY | Facility: CLINIC | Age: 88
End: 2023-06-08

## 2023-06-20 ENCOUNTER — APPOINTMENT (OUTPATIENT)
Dept: CARDIOLOGY | Facility: CLINIC | Age: 88
End: 2023-06-20
Payer: MEDICARE

## 2023-06-20 ENCOUNTER — NON-APPOINTMENT (OUTPATIENT)
Age: 88
End: 2023-06-20

## 2023-06-20 VITALS
BODY MASS INDEX: 25.2 KG/M2 | OXYGEN SATURATION: 98 % | WEIGHT: 180 LBS | HEIGHT: 71 IN | SYSTOLIC BLOOD PRESSURE: 124 MMHG | HEART RATE: 58 BPM | DIASTOLIC BLOOD PRESSURE: 60 MMHG

## 2023-06-20 DIAGNOSIS — I38 ENDOCARDITIS, VALVE UNSPECIFIED: ICD-10-CM

## 2023-06-20 PROCEDURE — 99214 OFFICE O/P EST MOD 30 MIN: CPT

## 2023-06-20 PROCEDURE — 93000 ELECTROCARDIOGRAM COMPLETE: CPT
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
## 2023-06-20 NOTE — CARDIOLOGY SUMMARY
[de-identified] : Echocardiogram July 6, 2021 normal LV contraction pattern EF 65% normal RV contraction pattern severely dilated LA severely dilated RA moderate MR moderate to severe TR PA pressure 35 mild to moderate AR. [de-identified] : Recent labs April 14, 2023 white count 3.21 hemoglobin 12 platelet 161 over enzymes normal glucose 97 sodium 144 potassium 3.8 BUN 14 creatinine 1.0 iron 60 TIBC 291 iron saturation 21%.

## 2023-06-20 NOTE — DISCUSSION/SUMMARY
[FreeTextEntry1] : I am actually very pleased with the patient's presentation today.  He appears much stronger than last visit and has had no acute cardiac symptoms.  Also his CBC is significantly improved.  The patient was tried a number of times on Eliquis each time resulting in significant blood loss and anemia sometimes requiring transfusions.  Therefore we have decided together with the patient and his wife to remain off all forms of anticoagulation excepting a slightly greater the risk of a thrombotic event.  The patient does indeed have evidence of valvular heart disease involving several valves.  However this is well compensated with no evidence of CHF and we will continue to treat this problem conservatively\par \par Today's exam reveals clear lung fields there is a grade 2 systolic murmur at the apex the electrocardiogram reveals atrial fibrillation with controlled ventricular rate\par \par The patient's remaining medications will be continued today without change and we will continue to follow the patient on a 6-month basis.\par \par \par \par

## 2023-06-20 NOTE — REASON FOR VISIT
[FreeTextEntry1] : Patient returns for follow-up today\par \par In recent months he has suffered from severe anemia usually due to marked epistaxis.  There was never a definitive diagnosis regarding the source of the bleeding and therefore there was no definitive therapy for this.  However there was recurrent bleeding when Eliquis was resumed and therefore the patient has been off Eliquis for the past few months.  Since he has been off Eliquis the bleeding has ceased.\par \par Patient had lost a considerable amount of weight but has started to gain it back with a robust appetite\par \par The patient also had lower GI pathology with colonoscopy revealing a couple polyps and diverticulosis but no clear bleeding source\par \par Patient was recently evaluated by Dr. TORRES of hematology.\par \par Patient is under the care of an oncologist regarding prostate cancer for which he receives Lupron injections\par \par This is also under good control.\par \par Into the patient's wife the patient's blood counts have been stable\par \par The patient's cardiac status has been remarkably stable\par \par He does have chronic atrial fibrillation and valvular heart disease (see echo report) however there is no evidence of congestive heart failure or myocardial ischemia or additional arrhythmias\par \par The patient states that he is feeling entirely well.\par \par \par \par \par \par

## 2023-06-29 ENCOUNTER — APPOINTMENT (OUTPATIENT)
Dept: FAMILY MEDICINE | Facility: CLINIC | Age: 88
End: 2023-06-29
Payer: MEDICARE

## 2023-06-29 VITALS
BODY MASS INDEX: 25.2 KG/M2 | SYSTOLIC BLOOD PRESSURE: 120 MMHG | HEART RATE: 73 BPM | TEMPERATURE: 98.6 F | WEIGHT: 180 LBS | OXYGEN SATURATION: 99 % | HEIGHT: 71 IN | DIASTOLIC BLOOD PRESSURE: 80 MMHG

## 2023-06-29 DIAGNOSIS — J30.9 ALLERGIC RHINITIS, UNSPECIFIED: ICD-10-CM

## 2023-06-29 DIAGNOSIS — I10 ESSENTIAL (PRIMARY) HYPERTENSION: ICD-10-CM

## 2023-06-29 DIAGNOSIS — Z00.00 ENCOUNTER FOR GENERAL ADULT MEDICAL EXAMINATION W/OUT ABNORMAL FINDINGS: ICD-10-CM

## 2023-06-29 DIAGNOSIS — E78.5 HYPERLIPIDEMIA, UNSPECIFIED: ICD-10-CM

## 2023-06-29 DIAGNOSIS — Z87.898 PERSONAL HISTORY OF OTHER SPECIFIED CONDITIONS: ICD-10-CM

## 2023-06-29 DIAGNOSIS — I48.91 UNSPECIFIED ATRIAL FIBRILLATION: ICD-10-CM

## 2023-06-29 DIAGNOSIS — N40.0 BENIGN PROSTATIC HYPERPLASIA WITHOUT LOWER URINARY TRACT SYMPMS: ICD-10-CM

## 2023-06-29 PROCEDURE — G0439: CPT

## 2023-06-29 PROCEDURE — 36415 COLL VENOUS BLD VENIPUNCTURE: CPT

## 2023-06-29 RX ORDER — APIXABAN 2.5 MG/1
2.5 TABLET, FILM COATED ORAL
Qty: 180 | Refills: 3 | Status: DISCONTINUED | COMMUNITY
Start: 2022-04-22 | End: 2023-06-29

## 2023-06-29 RX ORDER — LOSARTAN POTASSIUM 50 MG/1
50 TABLET, FILM COATED ORAL
Qty: 30 | Refills: 0 | Status: DISCONTINUED | COMMUNITY
Start: 2022-03-30 | End: 2023-06-29

## 2023-06-29 NOTE — ASSESSMENT
[FreeTextEntry1] : patient with appropriate preventative UTD \par no concerns on exam \par age appropriate counseling given \par \par Atrial fibrillation-\par Following with cardiology\par High risk of bleeding-patient is not on blood thinners after mutual decision making with his caretaker his wife and cardiology\par \par Prostate cancer\par -Currently taking Lupron\par -Followed with urology but also with heme-onc\par \par Hypertension\par -Blood pressure is within normal limits\par -Continue current management\par \par Dementia/memory changes\par -Patient behavior is better controlled at this time\par -Continues to use Seroquel as needed\par -At this time have discussed with the family imaging and work-up for underlying dementia-at this time patient and family would like to monitor have declined at this time further work-up will continue to follow\par -For now continue Seroquel have also discussed donepezil and memantine which is soon to slow progression\par -The family will consider\par \par

## 2023-06-29 NOTE — HEALTH RISK ASSESSMENT
[Very Good] : ~his/her~  mood as very good [No] : No [Never (0 pts)] : Never (0 points) [0] : 2) Feeling down, depressed, or hopeless: Not at all (0) [PHQ-2 Negative - No further assessment needed] : PHQ-2 Negative - No further assessment needed [With Significant Other] : lives with significant other [Retired] : retired [] :  [Feels Safe at Home] : Feels safe at home [Designated Healthcare Proxy] : Designated healthcare proxy [Name: ___] : Health Care Proxy's Name: [unfilled]  [Relationship: ___] : Relationship: [unfilled] [Former] : Former [Audit-CScore] : 0 [de-identified] : walking every morning for 1/2 mile [[id number]] : 0 [ColonoscopyComments] : no more colonoscpy [AdvancecareDate] : 6/29/2023

## 2023-06-29 NOTE — PHYSICAL EXAM
[No Abdominal Bruit] : a ~M bruit was not heard ~T in the abdomen [Pedal Pulses Present] : the pedal pulses are present [Normal] : soft, non-tender, non-distended, no masses palpated, no HSM and normal bowel sounds [Normal Posterior Cervical Nodes] : no posterior cervical lymphadenopathy [Normal Anterior Cervical Nodes] : no anterior cervical lymphadenopathy

## 2023-06-29 NOTE — HISTORY OF PRESENT ILLNESS
[FreeTextEntry1] : annual wellness exam [de-identified] : 87 year of age M presents for annual wellness exam\par \par up to date with pneumonia, shingles and Tdap vaccine\par \par

## 2023-06-30 LAB
ALBUMIN SERPL ELPH-MCNC: 4.1 G/DL
ALP BLD-CCNC: 119 U/L
ALT SERPL-CCNC: 8 U/L
ANION GAP SERPL CALC-SCNC: 12 MMOL/L
AST SERPL-CCNC: 20 U/L
BILIRUB SERPL-MCNC: 0.9 MG/DL
BUN SERPL-MCNC: 19 MG/DL
CALCIUM SERPL-MCNC: 9.9 MG/DL
CHLORIDE SERPL-SCNC: 110 MMOL/L
CHOLEST SERPL-MCNC: 142 MG/DL
CO2 SERPL-SCNC: 23 MMOL/L
CREAT SERPL-MCNC: 1.08 MG/DL
CREAT SPEC-SCNC: 134 MG/DL
EGFR: 67 ML/MIN/1.73M2
GLUCOSE SERPL-MCNC: 114 MG/DL
HDLC SERPL-MCNC: 48 MG/DL
LDLC SERPL CALC-MCNC: 80 MG/DL
MICROALBUMIN 24H UR DL<=1MG/L-MCNC: 2.5 MG/DL
MICROALBUMIN/CREAT 24H UR-RTO: 19 MG/G
NONHDLC SERPL-MCNC: 94 MG/DL
POTASSIUM SERPL-SCNC: 4.4 MMOL/L
PROT SERPL-MCNC: 8.3 G/DL
PSA FREE FLD-MCNC: 47 %
PSA FREE SERPL-MCNC: 5.84 NG/ML
PSA SERPL-MCNC: 12.5 NG/ML
SODIUM SERPL-SCNC: 146 MMOL/L
TRIGL SERPL-MCNC: 72 MG/DL

## 2023-07-06 ENCOUNTER — RESULT REVIEW (OUTPATIENT)
Age: 88
End: 2023-07-06

## 2023-07-06 ENCOUNTER — APPOINTMENT (OUTPATIENT)
Dept: HEMATOLOGY ONCOLOGY | Facility: CLINIC | Age: 88
End: 2023-07-06
Payer: MEDICARE

## 2023-07-06 VITALS
HEIGHT: 71 IN | WEIGHT: 184.13 LBS | TEMPERATURE: 95.7 F | DIASTOLIC BLOOD PRESSURE: 76 MMHG | SYSTOLIC BLOOD PRESSURE: 129 MMHG | HEART RATE: 67 BPM | OXYGEN SATURATION: 98 % | RESPIRATION RATE: 17 BRPM | BODY MASS INDEX: 25.78 KG/M2

## 2023-07-06 DIAGNOSIS — E61.1 IRON DEFICIENCY: ICD-10-CM

## 2023-07-06 PROCEDURE — 99214 OFFICE O/P EST MOD 30 MIN: CPT | Mod: 25

## 2023-07-06 PROCEDURE — 36415 COLL VENOUS BLD VENIPUNCTURE: CPT

## 2023-07-06 NOTE — HISTORY OF PRESENT ILLNESS
[de-identified] : Mr. Ahmet Soto is 86 year old male with leukopenia and normocytic anemia here for consultation. Patient is referred by Dr. Still. Patient is accompanied by his wife. Patient has documented mild cognitive impairment but is able to respond to simple questions and commands. Patients wife is relaying most information for todays visit. Patients wife states that he has lost around 50lbs in one year, unintentionally. Patient with past medical history of A Fib on Eliquis and  normocytic anemia who was recently discharged at [facility] on 2/8/22 for acute intermittent bright red blood per rectum, guaiac-positive stools, and Hgb was 6.8. Patient received 2 units of PRBCS. EGD w/ Dr. Hooks unrevealing 2/4/22.  Colonoscopy 2/5/22 w/ multiple (9) polyps, diverticulosis, but no clear bleeding source. Eliquis discontinued at this time. \par \par 2/15/22 wbc 3.71 Hgb 8.5 hct 29.6 mcv 86.8 plt 191\par 2/4/22 Ferritin 30\par Leucopenia since 2019, hx of thrombocytopenia\par \par Social Hx:\par Smoke:  former smoker\par ETOH: None\par Illicit drug use: None\par \par Family Hx:\par Denies significant family history [de-identified] : Patient is seen today for follow up\par Accompanied by his wife\par  s/p  IV venofer 200 mg  x 5 in March 2022\par \par He is due next [id number] lupron next month with Dr. Kraus\par He has difficult voiding once a while\par denies any bone pain and clinically the same. \par has d/c Eliquis as recommended by his cardiologist Dr. Zavaleta due to nose bleed. \par

## 2023-07-06 NOTE — REVIEW OF SYSTEMS
[FreeTextEntry2] : 10 point review of systems negative except as outlined in HPI [FreeTextEntry9] : walks with cane [de-identified] : mild cognitive impairment

## 2023-07-06 NOTE — ASSESSMENT
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 \par Given his dementia, comorbidities, PS - patient and wife wants to hold off bone marrow. \par Labs reviewed, analyzed, and discussed\par H/H stable, Ferritin pending - will call back in a few days to go over labs - to give more IV iron prn. \par \par \par #Prostate cancer - follows by Dr. Quan\par [place] score 4+5 = 9, grade group 5\par Very high risk \par 5/2022 bone scan - no mets. \par PSMA PET/CT - focal prostate uptake\par repeated Pet/CT PSMA - focal uptake and new perirectal lymph node \par on Casodex --> Lupron q3 months - last dose 1/30/2023 with Dr. Kraus\par PSA - 2.96--> 7.92 - > 6.86--> 8.90 ->> 10--> 12.50\par PSA has doubled in the last 6 month. \par continue Lupron q3 months and to repeat Pet/CT PSMA. IF POD, will consider h Zytiga/Xtandi + Lupron. \par \par Patient's wife (Lulu) did majority of the communication due to patients dementia\par She had multiple questions which were answered to satisfaction\par \par d/w Dr. Bañuelos \par rtc 2-3 months but wife would call before that to go over Pet/Ct scan result. \par CBC, CMP, ferritin, iron studies, PSA\par

## 2023-07-17 ENCOUNTER — RX RENEWAL (OUTPATIENT)
Age: 88
End: 2023-07-17

## 2023-07-17 ENCOUNTER — APPOINTMENT (OUTPATIENT)
Dept: UROLOGY | Facility: CLINIC | Age: 88
End: 2023-07-17
Payer: MEDICARE

## 2023-07-17 VITALS — SYSTOLIC BLOOD PRESSURE: 171 MMHG | HEART RATE: 81 BPM | DIASTOLIC BLOOD PRESSURE: 82 MMHG

## 2023-07-17 PROCEDURE — 99213 OFFICE O/P EST LOW 20 MIN: CPT

## 2023-07-17 NOTE — ASSESSMENT
[FreeTextEntry1] : Patient is a 87-year-old male with high-volume high-grade prostate cancer currently on hormone ablation therapy with rising PSA.  He is due for his 12-week Lupron Depo which we will give today.  I reviewed his last note with oncology his PSA has actually plateaued or stabilize it was 12.5 in June and it is now 12.4.  He denies any interval hematuria, dysuria or pelvic pain and is voiding at baseline and he is content.\par \par Assessment plan\par 1.  Prostate cancer on ADT therapy with rising PSA\par PSA is stabilized CT PET scan is pending and I will follow-up on the result.  He is going to explore getting his Lupron Depo injection at Dr. Bee's office since it is so much closer and I will see him back in 6 months.

## 2023-07-17 NOTE — PHYSICAL EXAM
[General Appearance - Well Developed] : well developed [Normal Appearance] : normal appearance [General Appearance - In No Acute Distress] : no acute distress [Abdomen Soft] : soft [Costovertebral Angle Tenderness] : no ~M costovertebral angle tenderness [] : no respiratory distress [Respiration, Rhythm And Depth] : normal respiratory rhythm and effort

## 2023-07-21 ENCOUNTER — RESULT REVIEW (OUTPATIENT)
Age: 88
End: 2023-07-21

## 2023-08-01 ENCOUNTER — APPOINTMENT (OUTPATIENT)
Dept: PODIATRY | Facility: CLINIC | Age: 88
End: 2023-08-01
Payer: MEDICARE

## 2023-08-01 VITALS — BODY MASS INDEX: 25.76 KG/M2 | WEIGHT: 184 LBS | HEIGHT: 71 IN

## 2023-08-01 PROCEDURE — 11721 DEBRIDE NAIL 6 OR MORE: CPT

## 2023-08-01 NOTE — PHYSICAL EXAM
[de-identified] : \par   [FreeTextEntry1] : The patient has all contributing factors to onychomycosis including but not limited to thickness, subungual debris, discoloration and partial lysis and they are brittle when cut.  [Deep Tendon Reflexes (DTR)] : deep tendon reflexes were 2+ and symmetric

## 2023-08-01 NOTE — PROCEDURE
[FreeTextEntry1] : Using sterile instrumentation debridement of all nails manually and electrically to decrease thickness, pain and girth and make shoe gear more comfortable with "slant back" procedure of any bordering spicules causing pain I have had a lengthy discussion with the patient regarding overall skincare. The importance of the type of socks, the type of shoes, and the type of overall foot hygiene is important to help control and prevent eruptions especially over extreme weather changes. This included but was not limited to hydration and lubrication, dove soap, triple rinse clothing and linens. I also explained the importance of thorough drying of both feet especially the web spaces. Given the extreme temperatures back in a car I also reviewed the type of shoes that would help reduce the chances of cracking of the skin especially leading to fissuring of the heels. Overall skincare precautions were reviewed education literature dispensed in the patient's questions asked and answered appropriately. follow up prn

## 2023-08-01 NOTE — HISTORY OF PRESENT ILLNESS
[FreeTextEntry1] : The patient presents for follow up of chronic and painful mycotic nail disease. Past professional  tx's in the presence of PAD have consisted of periodic debridements which have offered significant relief and controlling of symptoms\par

## 2023-08-09 ENCOUNTER — NON-APPOINTMENT (OUTPATIENT)
Age: 88
End: 2023-08-09

## 2023-08-11 ENCOUNTER — NON-APPOINTMENT (OUTPATIENT)
Age: 88
End: 2023-08-11

## 2023-08-17 RX ORDER — METOPROLOL SUCCINATE 25 MG/1
25 TABLET, EXTENDED RELEASE ORAL
Qty: 90 | Refills: 3 | Status: ACTIVE | COMMUNITY
Start: 2020-08-10 | End: 1900-01-01

## 2023-08-17 RX ORDER — LOSARTAN POTASSIUM 100 MG/1
100 TABLET, FILM COATED ORAL
Qty: 90 | Refills: 3 | Status: ACTIVE | COMMUNITY
Start: 2019-07-30 | End: 1900-01-01

## 2023-09-04 ENCOUNTER — RX RENEWAL (OUTPATIENT)
Age: 88
End: 2023-09-04

## 2023-09-06 ENCOUNTER — RESULT REVIEW (OUTPATIENT)
Age: 88
End: 2023-09-06

## 2023-09-06 ENCOUNTER — APPOINTMENT (OUTPATIENT)
Dept: HEMATOLOGY ONCOLOGY | Facility: CLINIC | Age: 88
End: 2023-09-06
Payer: MEDICARE

## 2023-09-06 VITALS
RESPIRATION RATE: 16 BRPM | HEIGHT: 71 IN | HEART RATE: 77 BPM | WEIGHT: 185.5 LBS | OXYGEN SATURATION: 98 % | BODY MASS INDEX: 25.97 KG/M2 | SYSTOLIC BLOOD PRESSURE: 162 MMHG | DIASTOLIC BLOOD PRESSURE: 86 MMHG | TEMPERATURE: 97.3 F

## 2023-09-06 PROCEDURE — 99214 OFFICE O/P EST MOD 30 MIN: CPT | Mod: 25

## 2023-09-06 PROCEDURE — 36415 COLL VENOUS BLD VENIPUNCTURE: CPT

## 2023-09-06 NOTE — ASSESSMENT
[FreeTextEntry1] : # #Prostate cancer - follows by Dr. Quan [place] score 4+5 = 9, grade group 5 Very high risk  5/2022 bone scan - no mets.  PSMA PET/CT - focal prostate uptake repeated Pet/CT PSMA - focal uptake and new perirectal lymph node  on Casodex --> Lupron q3 months - last dose 1/30/2023 with Dr. Kraus Been on Lupron [id number] with urologist - now switching over to our center  - next dosing on 10/10/2023- PSA - 2.96--> 7.92 - > 6.86--> 8.90 ->> 10--> 12.50  (PSA doubled in the last 6 month) 7/2023 Pet/CT scan -  known prostate cancer with increasing LNs uptake  With PSA doubling and Pet/Ct noted for POD - started patient on 2 tablets Erleada (8/10/2023 - present )  - well tolerated except for some loose BMs.  Advised to take Imodium prn diarrhea -Labs are drawn in the office, reviewed, analyzed, and discussed new PSA ordered -will increase Erleada if PSA elevated.   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  Denies any bleeding.  -Labs are drawn in the office, reviewed, analyzed, and discussed Hgb is holding and repeated Ferritin - will give more IV iron prn.   ##Lung nodule on RUL - slightly increased since 4/2022 but too small to characterize on Pet/CT  will closely monitor and follow up with CT scan in a few months.  Patient's wife (Lulu) did majority of the communication due to patients dementia She had multiple questions which were answered to satisfaction  d/w Dr. Bañuelos  rtc 2 months for labs and Lupron. CBC, CMP, ferritin, iron studies, PSA

## 2023-09-06 NOTE — HISTORY OF PRESENT ILLNESS
[de-identified] : Mr. Ahmet Soto is 86 year old male with leukopenia and normocytic anemia here for consultation. Patient is referred by Dr. Still. Patient is accompanied by his wife. Patient has documented mild cognitive impairment but is able to respond to simple questions and commands. Patients wife is relaying most information for todays visit. Patients wife states that he has lost around 50lbs in one year, unintentionally. Patient with past medical history of A Fib on Eliquis and  normocytic anemia who was recently discharged at [facility] on 2/8/22 for acute intermittent bright red blood per rectum, guaiac-positive stools, and Hgb was 6.8. Patient received 2 units of PRBCS. EGD w/ Dr. Hooks unrevealing 2/4/22.  Colonoscopy 2/5/22 w/ multiple (9) polyps, diverticulosis, but no clear bleeding source. Eliquis discontinued at this time. \par  \par  2/15/22 wbc 3.71 Hgb 8.5 hct 29.6 mcv 86.8 plt 191\par  2/4/22 Ferritin 30\par  Leucopenia since 2019, hx of thrombocytopenia\par  \par  Social Hx:\par  Smoke:  former smoker\par  ETOH: None\par  Illicit drug use: None\par  \par  Family Hx:\par  Denies significant family history [de-identified] : Patient is seen today for follow up Started 2 tablet Erleada (8/10/2023 - present) Accompanied by his wife  s/p  IV venofer 200 mg  x 5 in March 2022  Patient with intermittent diarrhea for years but slightly more in the last week with 4 loose BMs per day.  He's been getting Lupron with his urologist Dr. Kraus but like to get it here at [place], next dosing is on 10/10/2023 Denies any bone pain or  symptoms other than polyuria.

## 2023-09-06 NOTE — REVIEW OF SYSTEMS
[FreeTextEntry2] : 10 point review of systems negative except as outlined in HPI [FreeTextEntry9] : walks with cane [de-identified] : mild cognitive impairment

## 2023-09-06 NOTE — PHYSICAL EXAM
[Capable of only limited self care, confined to bed or chair more than 50% of waking hours] : Status 3- Capable of only limited self care, confined to bed or chair more than 50% of waking hours [Normal] : normal spine exam without palpable tenderness, no kyphosis or scoliosis [de-identified] : Dementia

## 2023-09-20 ENCOUNTER — RX RENEWAL (OUTPATIENT)
Age: 88
End: 2023-09-20

## 2023-10-09 ENCOUNTER — APPOINTMENT (OUTPATIENT)
Dept: UROLOGY | Facility: CLINIC | Age: 88
End: 2023-10-09

## 2023-10-10 ENCOUNTER — RESULT REVIEW (OUTPATIENT)
Age: 88
End: 2023-10-10

## 2023-10-10 ENCOUNTER — APPOINTMENT (OUTPATIENT)
Dept: HEMATOLOGY ONCOLOGY | Facility: CLINIC | Age: 88
End: 2023-10-10
Payer: MEDICARE

## 2023-10-10 VITALS
SYSTOLIC BLOOD PRESSURE: 159 MMHG | OXYGEN SATURATION: 100 % | HEART RATE: 71 BPM | RESPIRATION RATE: 16 BRPM | BODY MASS INDEX: 26.48 KG/M2 | DIASTOLIC BLOOD PRESSURE: 82 MMHG | WEIGHT: 189.13 LBS | HEIGHT: 71 IN | TEMPERATURE: 98 F

## 2023-10-10 PROCEDURE — 36415 COLL VENOUS BLD VENIPUNCTURE: CPT

## 2023-10-10 PROCEDURE — 99214 OFFICE O/P EST MOD 30 MIN: CPT

## 2023-10-19 ENCOUNTER — APPOINTMENT (OUTPATIENT)
Dept: HEMATOLOGY ONCOLOGY | Facility: CLINIC | Age: 88
End: 2023-10-19

## 2023-12-27 ENCOUNTER — RX RENEWAL (OUTPATIENT)
Age: 88
End: 2023-12-27

## 2024-01-01 NOTE — REASON FOR VISIT
[Anticoagulation] : anticoagulation [Atrial Fibrillation] : atrial fibrillation [FreeTextEntry1] : Last visit with Dr Meghann lyle added for edema.  Arnie reports decreased LE edema, denies SOB, no CP or palps On Coumadin for atrial fibrillationDenies bleeding or bruising.  \par  2024 15:51

## 2024-01-02 ENCOUNTER — APPOINTMENT (OUTPATIENT)
Dept: CARDIOLOGY | Facility: CLINIC | Age: 89
End: 2024-01-02

## 2024-01-09 ENCOUNTER — RESULT REVIEW (OUTPATIENT)
Age: 89
End: 2024-01-09

## 2024-01-09 ENCOUNTER — NON-APPOINTMENT (OUTPATIENT)
Age: 89
End: 2024-01-09

## 2024-01-09 ENCOUNTER — APPOINTMENT (OUTPATIENT)
Dept: HEMATOLOGY ONCOLOGY | Facility: CLINIC | Age: 89
End: 2024-01-09
Payer: MEDICARE

## 2024-01-09 VITALS
DIASTOLIC BLOOD PRESSURE: 96 MMHG | OXYGEN SATURATION: 96 % | WEIGHT: 193.13 LBS | TEMPERATURE: 97.7 F | BODY MASS INDEX: 27.04 KG/M2 | SYSTOLIC BLOOD PRESSURE: 163 MMHG | RESPIRATION RATE: 16 BRPM | HEART RATE: 71 BPM | HEIGHT: 71 IN

## 2024-01-09 DIAGNOSIS — D64.9 ANEMIA, UNSPECIFIED: ICD-10-CM

## 2024-01-09 PROCEDURE — 36415 COLL VENOUS BLD VENIPUNCTURE: CPT

## 2024-01-09 PROCEDURE — 99214 OFFICE O/P EST MOD 30 MIN: CPT

## 2024-01-09 RX ORDER — AMOXICILLIN 500 MG/1
500 CAPSULE ORAL
Qty: 20 | Refills: 0 | Status: COMPLETED | COMMUNITY
Start: 2023-04-30 | End: 2024-01-09

## 2024-01-18 ENCOUNTER — APPOINTMENT (OUTPATIENT)
Dept: HEART AND VASCULAR | Facility: CLINIC | Age: 89
End: 2024-01-18
Payer: MEDICARE

## 2024-01-18 ENCOUNTER — NON-APPOINTMENT (OUTPATIENT)
Age: 89
End: 2024-01-18

## 2024-01-18 VITALS
WEIGHT: 192 LBS | DIASTOLIC BLOOD PRESSURE: 80 MMHG | HEART RATE: 63 BPM | BODY MASS INDEX: 26.88 KG/M2 | SYSTOLIC BLOOD PRESSURE: 134 MMHG | OXYGEN SATURATION: 96 % | HEIGHT: 71 IN

## 2024-01-18 DIAGNOSIS — I07.1 RHEUMATIC TRICUSPID INSUFFICIENCY: ICD-10-CM

## 2024-01-18 PROCEDURE — 99204 OFFICE O/P NEW MOD 45 MIN: CPT

## 2024-01-18 NOTE — DISCUSSION/SUMMARY
[FreeTextEntry1] : 87 YO M with prostate cancer on Rx, HTN, A flutter off AC as per patient and Dr. Zavaleta's conversations, ? CVA, GI bleed and severe nose bleed with AC,  who is here for a follow up. Patient has no SOB/HART/chest pain/palpitations/syncope. Patient has severe TR/ mod MR/ mild-mod AR.   A fib/flutter  Rate controlled. Off AC Patient does not want to see anyone for a possible appendage closure. He feels fine and does not want to see any more doctors/ do any procedures.  Understands the risk/benefit of AC  Will repeat the TTE for the valvular disease  Will check the RV size/ annular size. He is asymptomatic at this time. 1 + edema in LE

## 2024-01-18 NOTE — PHYSICAL EXAM
[Well Developed] : well developed [Well Nourished] : well nourished [No Acute Distress] : no acute distress [Normal Conjunctiva] : normal conjunctiva [Normal Venous Pressure] : normal venous pressure [No Carotid Bruit] : no carotid bruit [Normal S1, S2] : normal S1, S2 [No Rub] : no rub [No Gallop] : no gallop [Clear Lung Fields] : clear lung fields [Good Air Entry] : good air entry [No Respiratory Distress] : no respiratory distress  [Soft] : abdomen soft [Non Tender] : non-tender [No Masses/organomegaly] : no masses/organomegaly [Normal Bowel Sounds] : normal bowel sounds [Normal Gait] : normal gait [No Edema] : no edema [No Cyanosis] : no cyanosis [No Clubbing] : no clubbing [No Varicosities] : no varicosities [No Rash] : no rash [No Skin Lesions] : no skin lesions [Moves all extremities] : moves all extremities [No Focal Deficits] : no focal deficits [Normal Speech] : normal speech [Alert and Oriented] : alert and oriented [Normal memory] : normal memory [de-identified] : GRADE II SYSTOLIC M AT [employer].

## 2024-01-18 NOTE — HISTORY OF PRESENT ILLNESS
[FreeTextEntry1] : 87 YO M with prostate cancer on Rx, HTN, A flutter off AC as per patient and Dr. Zavaleta's conversations, ? CVA, GI bleed and severe nose bleed with AC,  who is here for a follow up. Patient has no SOB/HART/chest pain/palpitations/syncope. Patient has severe TR/ mod MR/ mild-mod AR.

## 2024-01-19 ENCOUNTER — RX RENEWAL (OUTPATIENT)
Age: 89
End: 2024-01-19

## 2024-01-19 RX ORDER — FUROSEMIDE 20 MG/1
20 TABLET ORAL
Qty: 90 | Refills: 1 | Status: ACTIVE | COMMUNITY
Start: 2022-05-06 | End: 1900-01-01

## 2024-01-30 ENCOUNTER — APPOINTMENT (OUTPATIENT)
Age: 89
End: 2024-01-30

## 2024-02-14 ENCOUNTER — RX RENEWAL (OUTPATIENT)
Age: 89
End: 2024-02-14

## 2024-02-26 ENCOUNTER — APPOINTMENT (OUTPATIENT)
Dept: PODIATRY | Facility: CLINIC | Age: 89
End: 2024-02-26

## 2024-02-27 ENCOUNTER — RX RENEWAL (OUTPATIENT)
Age: 89
End: 2024-02-27

## 2024-03-13 ENCOUNTER — APPOINTMENT (OUTPATIENT)
Dept: PODIATRY | Facility: CLINIC | Age: 89
End: 2024-03-13
Payer: MEDICARE

## 2024-03-13 ENCOUNTER — APPOINTMENT (OUTPATIENT)
Dept: PODIATRY | Facility: HOSPITAL | Age: 89
End: 2024-03-13

## 2024-03-13 DIAGNOSIS — B35.1 TINEA UNGUIUM: ICD-10-CM

## 2024-03-13 DIAGNOSIS — G89.29 PAIN IN RIGHT FOOT: ICD-10-CM

## 2024-03-13 DIAGNOSIS — M79.671 PAIN IN RIGHT FOOT: ICD-10-CM

## 2024-03-13 DIAGNOSIS — G89.29 PAIN IN LEFT FOOT: ICD-10-CM

## 2024-03-13 DIAGNOSIS — I70.91 GENERALIZED ATHEROSCLEROSIS: ICD-10-CM

## 2024-03-13 DIAGNOSIS — M79.672 PAIN IN LEFT FOOT: ICD-10-CM

## 2024-03-13 PROCEDURE — 11721 DEBRIDE NAIL 6 OR MORE: CPT

## 2024-03-13 RX ORDER — TERBINAFINE HYDROCHLORIDE 250 MG/1
250 TABLET ORAL
Qty: 30 | Refills: 2 | Status: ACTIVE | COMMUNITY
Start: 2024-03-13 | End: 1900-01-01

## 2024-03-13 NOTE — PHYSICAL EXAM
[FreeTextEntry1] : The vascular exam reveals decreased pedal pulses bilateral, a capillary fill time of 3-5 seconds,  mild atrophic skin changes, mild varicosities absence of hair growth, but no cyanosis, clubbing or mottling seen.

## 2024-03-13 NOTE — PROCEDURE
[FreeTextEntry1] : A lengthy and inform a discussion with the patient regarding different types of treatments for the mycotic nail disease. I stressed clinical versus mycologic cure rates and anticipated success rates regarding topical medications oral medications as well as laser therapy. I discussed in great length with the patient the success rates and success rates anticipated. There were no guarantees given regarding any of the treatment reviewed. I did explain to the patient that there are risks to oral antifungal therapy however those risks can be greatly decreased with obtaining blood tests prior and possibly during the treatment if indicated. The patient will weigh their options and contact the office Using sterile instrumentation debridement of all nails manually and electrically to decrease thickness, pain and girth and make shoe gear more comfortable with "slant back" procedure of any bordering spicules causing pain  A lengthy discussion with the patient regarding the current medication being prescribed, the dosage, frequency, and the need to be consistent with taking the medication at or about the same time every day. I also spent time reviewing the risks alternatives and benefits to the medication as well as the potential side effects. The decision was made to proceed with the medication follow up appt 6 weeks

## 2024-03-22 ENCOUNTER — APPOINTMENT (OUTPATIENT)
Dept: PODIATRY | Facility: CLINIC | Age: 89
End: 2024-03-22

## 2024-04-04 ENCOUNTER — RX RENEWAL (OUTPATIENT)
Age: 89
End: 2024-04-04

## 2024-04-16 ENCOUNTER — RESULT REVIEW (OUTPATIENT)
Age: 89
End: 2024-04-16

## 2024-04-16 ENCOUNTER — APPOINTMENT (OUTPATIENT)
Dept: HEMATOLOGY ONCOLOGY | Facility: CLINIC | Age: 89
End: 2024-04-16
Payer: MEDICARE

## 2024-04-16 VITALS
TEMPERATURE: 97.1 F | BODY MASS INDEX: 27.73 KG/M2 | SYSTOLIC BLOOD PRESSURE: 176 MMHG | OXYGEN SATURATION: 99 % | RESPIRATION RATE: 17 BRPM | HEART RATE: 81 BPM | HEIGHT: 71 IN | DIASTOLIC BLOOD PRESSURE: 93 MMHG | WEIGHT: 198.06 LBS

## 2024-04-16 DIAGNOSIS — I82.401 ACUTE EMBOLISM AND THROMBOSIS OF UNSPECIFIED DEEP VEINS OF RIGHT LOWER EXTREMITY: ICD-10-CM

## 2024-04-16 PROCEDURE — G2211 COMPLEX E/M VISIT ADD ON: CPT

## 2024-04-16 PROCEDURE — 36415 COLL VENOUS BLD VENIPUNCTURE: CPT

## 2024-04-16 PROCEDURE — 99214 OFFICE O/P EST MOD 30 MIN: CPT

## 2024-04-16 NOTE — REVIEW OF SYSTEMS
[FreeTextEntry2] : 10 point review of systems negative except as outlined in HPI [FreeTextEntry9] : walks with cane [de-identified] : mild cognitive impairment

## 2024-04-16 NOTE — ASSESSMENT
[Designated Health Care Proxy] : Designated Health Care Proxy [Name: ___] : Name: [unfilled] [Relationship: ___] : Relationship: [unfilled] [DNR] : DNR [FreeTextEntry1] : # #Prostate cancer - follows by Dr. Quan Ajo score 4+5 = 9, grade group 5 Very high risk 5/2022 bone scan - no mets. PSMA PET/CT - focal prostate uptake repeated Pet/CT PSMA - focal uptake and new perirectal lymph node on Casodex --> Lupron q3 months PSA - 2.96--> 7.92 - > 6.86--> 8.90 ->> 10--> 12.50 (PSA doubled in the last 6 month) 7/2023 Pet/CT scan - known prostate cancer with increasing LNs uptake With PSA doubling and Pet/Ct noted for POD - started patient on Erleada (8/10/2023 - present ) He is currently on Erleada 4 pills QD TOlerating well Labs ordered, drawn in the office, reviewed, analyzed and discussed PSA trending down nicely. 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 Denies any bleeding. -Labs are drawn in the office, reviewed, analyzed, and discussed Hgb is holding and repeated Ferritin - will give more IV iron prn.  ##Lung nodule on RUL - slightly increased since 4/2022 but too small to characterize on Pet/CT will closely monitor and follow up with CT scan in a few months.  Patient's wife (Lulu) did majority of the communication due to patients dementia She had multiple questions which were answered to satisfaction  Follow-up in 3 months for continued Lupron CBC, CMP, ferritin, iron studies, PSA. [FreeTextEntry2] : [place] - [phone]

## 2024-04-16 NOTE — HISTORY OF PRESENT ILLNESS
[de-identified] : Mr. Ahmet Soto is 86 year old male with leukopenia and normocytic anemia here for consultation. Patient is referred by Dr. Still. Patient is accompanied by his wife. Patient has documented mild cognitive impairment but is able to respond to simple questions and commands. Patients wife is relaying most information for todays visit. Patients wife states that he has lost around 50lbs in one year, unintentionally. Patient with past medical history of A Fib on Eliquis and  normocytic anemia who was recently discharged at [facility] on 2/8/22 for acute intermittent bright red blood per rectum, guaiac-positive stools, and Hgb was 6.8. Patient received 2 units of PRBCS. EGD w/ Dr. Hooks unrevealing 2/4/22.  Colonoscopy 2/5/22 w/ multiple (9) polyps, diverticulosis, but no clear bleeding source. Eliquis discontinued at this time.   2/15/22 wbc 3.71 Hgb 8.5 hct 29.6 mcv 86.8 plt 191 2/4/22 Ferritin 30 Leucopenia since 2019, hx of thrombocytopenia  Social Hx: Smoke:  former smoker ETOH: None Illicit drug use: None  Family Hx: Denies significant family history   s/p  IV venofer 200 mg  x 5 in March 2022 [de-identified] : Patient is seen today for follow up and continue lupron Q3M Was previously getting with Dr Kraus Started Erleada (8/10/2023 - present) Accompanied by his wife  Tolerating erleada well. Now taking 4 pills at night Had nosebleed through the right nostril status post packing.  I advised him to see ENT

## 2024-04-16 NOTE — PHYSICAL EXAM
[Capable of only limited self care, confined to bed or chair more than 50% of waking hours] : Status 3- Capable of only limited self care, confined to bed or chair more than 50% of waking hours [Normal] : normal spine exam without palpable tenderness, no kyphosis or scoliosis [de-identified] : Dementia

## 2024-04-23 ENCOUNTER — APPOINTMENT (OUTPATIENT)
Dept: PODIATRY | Facility: CLINIC | Age: 89
End: 2024-04-23

## 2024-05-15 ENCOUNTER — APPOINTMENT (OUTPATIENT)
Dept: FAMILY MEDICINE | Facility: CLINIC | Age: 89
End: 2024-05-15
Payer: COMMERCIAL

## 2024-05-15 VITALS
DIASTOLIC BLOOD PRESSURE: 90 MMHG | SYSTOLIC BLOOD PRESSURE: 160 MMHG | OXYGEN SATURATION: 95 % | WEIGHT: 195 LBS | HEIGHT: 71 IN | BODY MASS INDEX: 27.3 KG/M2 | HEART RATE: 77 BPM

## 2024-05-15 VITALS — DIASTOLIC BLOOD PRESSURE: 80 MMHG | SYSTOLIC BLOOD PRESSURE: 138 MMHG

## 2024-05-15 DIAGNOSIS — R04.0 EPISTAXIS: ICD-10-CM

## 2024-05-15 DIAGNOSIS — D64.9 ANEMIA, UNSPECIFIED: ICD-10-CM

## 2024-05-15 DIAGNOSIS — C61 MALIGNANT NEOPLASM OF PROSTATE: ICD-10-CM

## 2024-05-15 PROCEDURE — G2211 COMPLEX E/M VISIT ADD ON: CPT

## 2024-05-15 PROCEDURE — 99213 OFFICE O/P EST LOW 20 MIN: CPT

## 2024-05-15 PROCEDURE — 36415 COLL VENOUS BLD VENIPUNCTURE: CPT

## 2024-05-15 NOTE — ASSESSMENT
[FreeTextEntry1] : Abdominal discomfort and constipation Highly suspect that this may be a consequence of the iron Ongoing constipation secondary to this iron He is taking it every other day would recommend holding for period of time and reassessing No active bleed has been identified recently No longer on anticoagulants This may improve the patient's constipation GI symptoms and improve symptoms overall If necessary we can add a stool softener  Nosebleeds Would recommend repeating the CBC Will refer the patient to ENT for evaluation Suspect that the patient would need cauterization He declined at his last visit as the patient was concerned for pain related to procedure Given the ongoing nature of the nosebleeds and the risk to iron deficiency anemia in this patient and the concern of the patient would recommend that he reconsider follow-up with ENT and consider a full eval to see if a artery could be cauterized to prevent persistent nosebleeds

## 2024-05-15 NOTE — HISTORY OF PRESENT ILLNESS
[de-identified] : 88-year-old male with a history of prostate cancer, atrial fibrillation no longer on AC, history of GI bleed and anemia due to iron deficiency presents today for follow-up Nose bleed  - lasted a few hors - every 10 days there is a nosebleed - ENT - follow up for packing - but he declined to do a cauterization  Pain in stomach  - patient notes he thinks it is hunger  - no nausea or vomiting - there is some diarrhea periodically - every couple of weeks But he primarily notes that abundant stool is hard and difficult to come out - Most recent hemoglobin was 14 - Patient has no nausea vomiting, fevers, changes in weight

## 2024-05-16 LAB
BASOPHILS # BLD AUTO: 0.02 K/UL
BASOPHILS NFR BLD AUTO: 0.6 %
EOSINOPHIL # BLD AUTO: 0.17 K/UL
EOSINOPHIL NFR BLD AUTO: 4.7 %
HCT VFR BLD CALC: 39.8 %
HGB BLD-MCNC: 12.8 G/DL
IMM GRANULOCYTES NFR BLD AUTO: 0.3 %
LYMPHOCYTES # BLD AUTO: 1.3 K/UL
LYMPHOCYTES NFR BLD AUTO: 35.9 %
MAN DIFF?: NORMAL
MCHC RBC-ENTMCNC: 30.4 PG
MCHC RBC-ENTMCNC: 32.2 GM/DL
MCV RBC AUTO: 94.5 FL
MONOCYTES # BLD AUTO: 0.53 K/UL
MONOCYTES NFR BLD AUTO: 14.6 %
NEUTROPHILS # BLD AUTO: 1.59 K/UL
NEUTROPHILS NFR BLD AUTO: 43.9 %
PLATELET # BLD AUTO: 142 K/UL
RBC # BLD: 4.21 M/UL
RBC # FLD: 14.2 %
WBC # FLD AUTO: 3.62 K/UL

## 2024-05-20 RX ORDER — QUETIAPINE FUMARATE 25 MG/1
25 TABLET ORAL
Qty: 270 | Refills: 1 | Status: ACTIVE | COMMUNITY
Start: 1900-01-01 | End: 1900-01-01

## 2024-06-17 ENCOUNTER — RX RENEWAL (OUTPATIENT)
Age: 89
End: 2024-06-17

## 2024-06-17 RX ORDER — PANTOPRAZOLE 40 MG/1
40 TABLET, DELAYED RELEASE ORAL
Qty: 90 | Refills: 1 | Status: ACTIVE | COMMUNITY
Start: 2023-03-27 | End: 1900-01-01

## 2024-06-25 ENCOUNTER — RX RENEWAL (OUTPATIENT)
Age: 89
End: 2024-06-25

## 2024-06-25 RX ORDER — APALUTAMIDE 60 MG/1
60 TABLET, FILM COATED ORAL
Qty: 120 | Refills: 0 | Status: ACTIVE | COMMUNITY
Start: 2023-08-03 | End: 1900-01-01

## 2024-07-12 ENCOUNTER — RX RENEWAL (OUTPATIENT)
Age: 89
End: 2024-07-12

## 2024-07-16 ENCOUNTER — RESULT REVIEW (OUTPATIENT)
Age: 89
End: 2024-07-16

## 2024-07-16 ENCOUNTER — APPOINTMENT (OUTPATIENT)
Dept: HEMATOLOGY ONCOLOGY | Facility: CLINIC | Age: 89
End: 2024-07-16
Payer: MEDICARE

## 2024-07-16 VITALS
RESPIRATION RATE: 16 BRPM | DIASTOLIC BLOOD PRESSURE: 87 MMHG | HEIGHT: 71 IN | SYSTOLIC BLOOD PRESSURE: 161 MMHG | BODY MASS INDEX: 26.6 KG/M2 | HEART RATE: 81 BPM | OXYGEN SATURATION: 96 % | TEMPERATURE: 97.4 F | WEIGHT: 190 LBS

## 2024-07-16 DIAGNOSIS — D50.9 IRON DEFICIENCY ANEMIA, UNSPECIFIED: ICD-10-CM

## 2024-07-16 DIAGNOSIS — R91.1 SOLITARY PULMONARY NODULE: ICD-10-CM

## 2024-07-16 DIAGNOSIS — C61 MALIGNANT NEOPLASM OF PROSTATE: ICD-10-CM

## 2024-07-16 PROCEDURE — 99214 OFFICE O/P EST MOD 30 MIN: CPT

## 2024-07-16 PROCEDURE — 36415 COLL VENOUS BLD VENIPUNCTURE: CPT

## 2024-07-16 PROCEDURE — G2211 COMPLEX E/M VISIT ADD ON: CPT

## 2024-07-17 PROBLEM — R91.1 LUNG NODULE: Status: ACTIVE | Noted: 2024-07-17

## 2024-08-08 ENCOUNTER — RX RENEWAL (OUTPATIENT)
Age: 89
End: 2024-08-08

## 2024-08-16 ENCOUNTER — RX RENEWAL (OUTPATIENT)
Age: 89
End: 2024-08-16

## 2024-08-22 ENCOUNTER — RX RENEWAL (OUTPATIENT)
Age: 89
End: 2024-08-22

## 2024-08-27 ENCOUNTER — APPOINTMENT (OUTPATIENT)
Age: 89
End: 2024-08-27
Payer: MEDICARE

## 2024-08-27 VITALS
OXYGEN SATURATION: 96 % | WEIGHT: 190 LBS | HEART RATE: 84 BPM | SYSTOLIC BLOOD PRESSURE: 150 MMHG | BODY MASS INDEX: 26.6 KG/M2 | DIASTOLIC BLOOD PRESSURE: 90 MMHG | HEIGHT: 71 IN

## 2024-08-27 DIAGNOSIS — C61 MALIGNANT NEOPLASM OF PROSTATE: ICD-10-CM

## 2024-08-27 DIAGNOSIS — R60.0 LOCALIZED EDEMA: ICD-10-CM

## 2024-08-27 DIAGNOSIS — D50.9 IRON DEFICIENCY ANEMIA, UNSPECIFIED: ICD-10-CM

## 2024-08-27 DIAGNOSIS — K92.2 GASTROINTESTINAL HEMORRHAGE, UNSPECIFIED: ICD-10-CM

## 2024-08-27 DIAGNOSIS — L03.90 CELLULITIS, UNSPECIFIED: ICD-10-CM

## 2024-08-27 PROCEDURE — 36415 COLL VENOUS BLD VENIPUNCTURE: CPT

## 2024-08-27 PROCEDURE — G2211 COMPLEX E/M VISIT ADD ON: CPT

## 2024-08-27 PROCEDURE — 99215 OFFICE O/P EST HI 40 MIN: CPT

## 2024-08-27 NOTE — HISTORY OF PRESENT ILLNESS
[de-identified] : 88-year-old male with a history of atrial fibrillation, iron deficiency anemia secondary to GI bleed in the past, hyperlipidemia, prostate cancer, presents today for follow-up Of note patient's wife contacted our office noting that the patient was having black dark stools fatigue.  See chart note for further detail  Patient is taking Lupron and Erleada pet scan in a few months   Blood pressure is elevated but patient may have run out of metoprolol as it is due for refill and last dose would have been August 11   For the last 2 weeks patient has had increased lower extremity edema particularly of the left lower extremity when compared to the right He has a wound of that area which is surrounded by erythema there is no tenderness to palpation

## 2024-08-27 NOTE — PHYSICAL EXAM
[Normal] : no respiratory distress, lungs were clear to auscultation bilaterally and no accessory muscle use [de-identified] : irregualr [de-identified] : Edema left greater than right [de-identified] : 1.5 inch in diameter wound superficial of the left lower extremity-laterally with surrounding area of warmth and erythema-

## 2024-08-27 NOTE — ASSESSMENT
[FreeTextEntry1] : Wound left lower extremity With cellulitis This is likely secondary to the edema of this lower extremity recommending that the patient have Lasix for 5 days to lower the edema We will be ordering an echocardiogram and an ultrasound of the left lower extremity to assess for DVT as well as assess the patient's EF Also sending Bactrim for 7 days given the erythema which is highly concerning for cellulitis Have counseled on the wound which appears to be a superficial wound likely skin tear Recommending nonadhesive bandage with bacitracin gentle cleansing with soap and water and drying before placement of the dressing  Edema May be a component of CHF exacerbation very mild given that patient has no other symptoms of significant shortness of breath tachycardia Does not appear ill recommending that we as discussed above do Lasix and echocardiogram  Black stool Suspect melena The guaiac stool testing today was negative for bleedingm with brown stool Recommending that we do a hemoglobin hematocrit to assess  Have recommended that if there is any change in condition.  Including significant shortness of breath worsening edema chest pain worsening erythema around the wound the patient may need to be seen in the emergency room for further eval given his age and complicated picture

## 2024-08-28 DIAGNOSIS — E87.6 HYPOKALEMIA: ICD-10-CM

## 2024-08-28 LAB
ALBUMIN SERPL ELPH-MCNC: 3.4 G/DL
ALP BLD-CCNC: 81 U/L
ALT SERPL-CCNC: 5 U/L
ANION GAP SERPL CALC-SCNC: 10 MMOL/L
AST SERPL-CCNC: 15 U/L
BASOPHILS # BLD AUTO: 0.02 K/UL
BASOPHILS NFR BLD AUTO: 0.5 %
BILIRUB SERPL-MCNC: 1.3 MG/DL
BUN SERPL-MCNC: 11 MG/DL
CALCIUM SERPL-MCNC: 8.8 MG/DL
CHLORIDE SERPL-SCNC: 109 MMOL/L
CO2 SERPL-SCNC: 26 MMOL/L
CREAT SERPL-MCNC: 0.88 MG/DL
EGFR: 84 ML/MIN/1.73M2
EOSINOPHIL # BLD AUTO: 0.01 K/UL
EOSINOPHIL NFR BLD AUTO: 0.3 %
FERRITIN SERPL-MCNC: 132 NG/ML
FOLATE SERPL-MCNC: 6.2 NG/ML
GLUCOSE SERPL-MCNC: 116 MG/DL
HCT VFR BLD CALC: 40 %
HGB BLD-MCNC: 12.3 G/DL
IMM GRANULOCYTES NFR BLD AUTO: 0.8 %
IRON SATN MFR SERPL: 33 %
IRON SERPL-MCNC: 60 UG/DL
LYMPHOCYTES # BLD AUTO: 1.17 K/UL
LYMPHOCYTES NFR BLD AUTO: 31.7 %
MAN DIFF?: NORMAL
MCHC RBC-ENTMCNC: 30.8 GM/DL
MCHC RBC-ENTMCNC: 30.8 PG
MCV RBC AUTO: 100 FL
MONOCYTES # BLD AUTO: 0.58 K/UL
MONOCYTES NFR BLD AUTO: 15.7 %
NEUTROPHILS # BLD AUTO: 1.88 K/UL
NEUTROPHILS NFR BLD AUTO: 51 %
NT-PROBNP SERPL-MCNC: 1654 PG/ML
PLATELET # BLD AUTO: 110 K/UL
POTASSIUM SERPL-SCNC: 3.2 MMOL/L
PROT SERPL-MCNC: 6.4 G/DL
RBC # BLD: 4 M/UL
RBC # FLD: 16.1 %
SODIUM SERPL-SCNC: 145 MMOL/L
TIBC SERPL-MCNC: 181 UG/DL
UIBC SERPL-MCNC: 122 UG/DL
VIT B12 SERPL-MCNC: 267 PG/ML
WBC # FLD AUTO: 3.69 K/UL

## 2024-08-28 RX ORDER — POTASSIUM CHLORIDE 1500 MG/1
20 TABLET, EXTENDED RELEASE ORAL
Qty: 28 | Refills: 0 | Status: ACTIVE | COMMUNITY
Start: 2024-08-28 | End: 2024-09-11

## 2024-08-29 ENCOUNTER — RESULT REVIEW (OUTPATIENT)
Age: 89
End: 2024-08-29

## 2024-09-06 ENCOUNTER — APPOINTMENT (OUTPATIENT)
Age: 89
End: 2024-09-06
Payer: MEDICARE

## 2024-09-06 VITALS
SYSTOLIC BLOOD PRESSURE: 120 MMHG | OXYGEN SATURATION: 96 % | HEART RATE: 72 BPM | HEIGHT: 71 IN | WEIGHT: 190 LBS | DIASTOLIC BLOOD PRESSURE: 80 MMHG | BODY MASS INDEX: 26.6 KG/M2

## 2024-09-06 DIAGNOSIS — N40.0 BENIGN PROSTATIC HYPERPLASIA WITHOUT LOWER URINARY TRACT SYMPMS: ICD-10-CM

## 2024-09-06 DIAGNOSIS — I10 ESSENTIAL (PRIMARY) HYPERTENSION: ICD-10-CM

## 2024-09-06 DIAGNOSIS — F09 UNSPECIFIED MENTAL DISORDER DUE TO KNOWN PHYSIOLOGICAL CONDITION: ICD-10-CM

## 2024-09-06 PROCEDURE — G2211 COMPLEX E/M VISIT ADD ON: CPT

## 2024-09-06 PROCEDURE — 99214 OFFICE O/P EST MOD 30 MIN: CPT

## 2024-09-06 RX ORDER — SULFAMETHOXAZOLE AND TRIMETHOPRIM 800; 160 MG/1; MG/1
800-160 TABLET ORAL TWICE DAILY
Qty: 14 | Refills: 0 | Status: ACTIVE | COMMUNITY
Start: 2024-08-27 | End: 1900-01-01

## 2024-09-06 NOTE — PHYSICAL EXAM
[Normal] : no acute distress, well nourished, well developed and well-appearing [de-identified] : edema of the left LE  [de-identified] : wounds appear to be improving with granulation tissue;  Fusiform Excision Additional Text (Leave Blank If You Do Not Want): The margin was drawn around the clinically apparent lesion.  A fusiform shape was then drawn on the skin incorporating the lesion and margins.  Incisions were then made along these lines to the appropriate tissue plane and the lesion was extirpated.

## 2024-09-06 NOTE — HISTORY OF PRESENT ILLNESS
[de-identified] : 88-year-old male with a history of atrial fibrillation, BPH, hypertension, hyperlipidemia, cognitive impairment, history of DVT presents today for follow-up Patient was seen last week due to concerns of possible GI bleed he also had wounds of his lower extremities At our last visit he was started on Bactrim for the lower extremity cellulitis He was ordered to have an ultrasound of the lower extremities and an echocardiogram was ordered These are still pending His labs were largely unremarkable with no sign of significant anemia and an elevated BNP was noted with no comparison Echocardiogram notes an EF of 58%.  Enlarged right ventricle left atrium is dilated. There is severe tricuspid regurg US with no dvt, high R sided pressure noted  edema noted subcu

## 2024-09-06 NOTE — ASSESSMENT
[FreeTextEntry1] : Lower extremity wounds Skin tear Recommend continued gentle cleansing soap and water Continue nonadhesive bandages Recommending continue Bactrim I suspect there is cellulitis Considering an increase in Lasix temporarily to 40 mg Checking renal function potassium given recent Bactrim and Lasix Continue antibiotic to complete 2 weeks  close follow up

## 2024-09-09 LAB
ANION GAP SERPL CALC-SCNC: 12 MMOL/L
BUN SERPL-MCNC: 13 MG/DL
CALCIUM SERPL-MCNC: 9.6 MG/DL
CHLORIDE SERPL-SCNC: 108 MMOL/L
CO2 SERPL-SCNC: 22 MMOL/L
CREAT SERPL-MCNC: 0.98 MG/DL
EGFR: 75 ML/MIN/1.73M2
GLUCOSE SERPL-MCNC: 100 MG/DL
POTASSIUM SERPL-SCNC: 5.4 MMOL/L
SODIUM SERPL-SCNC: 142 MMOL/L

## 2024-09-12 ENCOUNTER — APPOINTMENT (OUTPATIENT)
Age: 89
End: 2024-09-12
Payer: MEDICARE

## 2024-09-12 VITALS
WEIGHT: 190 LBS | HEIGHT: 71 IN | DIASTOLIC BLOOD PRESSURE: 70 MMHG | BODY MASS INDEX: 26.6 KG/M2 | OXYGEN SATURATION: 96 % | SYSTOLIC BLOOD PRESSURE: 110 MMHG | HEART RATE: 70 BPM

## 2024-09-12 DIAGNOSIS — L03.90 CELLULITIS, UNSPECIFIED: ICD-10-CM

## 2024-09-12 DIAGNOSIS — S81.812D LACERATION W/OUT FOREIGN BODY, LEFT LOWER LEG, SUBSEQUENT ENCOUNTER: ICD-10-CM

## 2024-09-12 DIAGNOSIS — E78.5 HYPERLIPIDEMIA, UNSPECIFIED: ICD-10-CM

## 2024-09-12 DIAGNOSIS — I48.91 UNSPECIFIED ATRIAL FIBRILLATION: ICD-10-CM

## 2024-09-12 PROCEDURE — G2211 COMPLEX E/M VISIT ADD ON: CPT

## 2024-09-12 PROCEDURE — 36415 COLL VENOUS BLD VENIPUNCTURE: CPT

## 2024-09-12 PROCEDURE — 99213 OFFICE O/P EST LOW 20 MIN: CPT

## 2024-09-12 NOTE — HISTORY OF PRESENT ILLNESS
[de-identified] : 88-year-old male with a history of dementia, atrial fibrillation, presents today due to wound of the left lower extremity Patient developed edema of the left lower extremity associated with erythema warmth and to wound which appeared to be skin tears of unclear etiology according the patient's spouse  Follow-up today after wound care at home  Of note the patient has reduced edema of the area.  The wounds appear to be improved according to wife.  There continues to be no other systemic symptoms noted

## 2024-09-12 NOTE — ASSESSMENT
[FreeTextEntry1] : Skin tear Appears to be healing without issue Recommend to complete antibiotics as written Continue to monitor with regular changes of the dressing Cleanse with soap and water keep dry Have advised that while away there is any issue should contact wound care to see if an appointment can be made if not patient can consider an ER visit and if there is any complications with the wound  Hyperkalemia Noted on last labs Lasix were given to lower the edema of the lower extremity Checking the potassium at this time

## 2024-09-12 NOTE — PHYSICAL EXAM
[Normal] : no acute distress, well nourished, well developed and well-appearing [No Respiratory Distress] : no respiratory distress  [Normal Rate] : normal rate  [de-identified] : Skin tears do appear to be healing with granulation tissue present, edges of the wound appear to be closing, no significant drainage, there is a bulla present that continues to drain easily

## 2024-09-17 ENCOUNTER — NON-APPOINTMENT (OUTPATIENT)
Age: 89
End: 2024-09-17

## 2024-09-18 ENCOUNTER — RX RENEWAL (OUTPATIENT)
Age: 89
End: 2024-09-18

## 2024-09-18 LAB
ANION GAP SERPL CALC-SCNC: 12 MMOL/L
BUN SERPL-MCNC: 14 MG/DL
CALCIUM SERPL-MCNC: 9.7 MG/DL
CHLORIDE SERPL-SCNC: 101 MMOL/L
CO2 SERPL-SCNC: 23 MMOL/L
CREAT SERPL-MCNC: 1.27 MG/DL
EGFR: 55 ML/MIN/1.73M2
GLUCOSE SERPL-MCNC: 95 MG/DL
POTASSIUM SERPL-SCNC: 5 MMOL/L
SODIUM SERPL-SCNC: 136 MMOL/L

## 2024-09-25 ENCOUNTER — APPOINTMENT (OUTPATIENT)
Age: 89
End: 2024-09-25
Payer: MEDICARE

## 2024-09-25 VITALS
DIASTOLIC BLOOD PRESSURE: 70 MMHG | HEIGHT: 71 IN | WEIGHT: 190 LBS | HEART RATE: 80 BPM | SYSTOLIC BLOOD PRESSURE: 120 MMHG | OXYGEN SATURATION: 96 % | BODY MASS INDEX: 26.6 KG/M2

## 2024-09-25 DIAGNOSIS — S81.812D LACERATION W/OUT FOREIGN BODY, LEFT LOWER LEG, SUBSEQUENT ENCOUNTER: ICD-10-CM

## 2024-09-25 PROCEDURE — G2211 COMPLEX E/M VISIT ADD ON: CPT

## 2024-09-25 PROCEDURE — 99213 OFFICE O/P EST LOW 20 MIN: CPT

## 2024-09-25 NOTE — ASSESSMENT
[FreeTextEntry1] : Skin tear evaluated today Appears that the skin is well-healed there is still eschar present however this is slowly healing The area is reduced and edema No major concerns at this time patient to follow-up as needed

## 2024-09-25 NOTE — HISTORY OF PRESENT ILLNESS
[de-identified] : 88-year-old male presenting today for follow-up of lower extremity wound and lower extremity edema Patient at our last visit was noted to have excellent healing of the wound in question They have since continued the gentle cleansing with soap and water as well as the application of a nonadhesive bandage

## 2024-09-25 NOTE — PHYSICAL EXAM
[Normal] : normal rate, regular rhythm, normal S1 and S2 and no murmur heard [de-identified] : well healed no concerns

## 2024-10-01 ENCOUNTER — NON-APPOINTMENT (OUTPATIENT)
Age: 89
End: 2024-10-01

## 2024-10-01 ENCOUNTER — RX RENEWAL (OUTPATIENT)
Age: 89
End: 2024-10-01

## 2024-10-10 ENCOUNTER — NON-APPOINTMENT (OUTPATIENT)
Age: 89
End: 2024-10-10

## 2024-10-10 ENCOUNTER — APPOINTMENT (OUTPATIENT)
Dept: HEART AND VASCULAR | Facility: CLINIC | Age: 89
End: 2024-10-10
Payer: MEDICARE

## 2024-10-10 VITALS
DIASTOLIC BLOOD PRESSURE: 70 MMHG | SYSTOLIC BLOOD PRESSURE: 128 MMHG | WEIGHT: 194 LBS | OXYGEN SATURATION: 99 % | HEART RATE: 66 BPM | BODY MASS INDEX: 27.16 KG/M2 | HEIGHT: 71 IN

## 2024-10-10 DIAGNOSIS — I07.1 RHEUMATIC TRICUSPID INSUFFICIENCY: ICD-10-CM

## 2024-10-10 DIAGNOSIS — R06.89 OTHER ABNORMALITIES OF BREATHING: ICD-10-CM

## 2024-10-10 DIAGNOSIS — R60.0 LOCALIZED EDEMA: ICD-10-CM

## 2024-10-10 DIAGNOSIS — I27.20 PULMONARY HYPERTENSION, UNSPECIFIED: ICD-10-CM

## 2024-10-10 DIAGNOSIS — I48.91 UNSPECIFIED ATRIAL FIBRILLATION: ICD-10-CM

## 2024-10-10 DIAGNOSIS — I50.810 RIGHT HEART FAILURE, UNSPECIFIED: ICD-10-CM

## 2024-10-10 PROCEDURE — 93000 ELECTROCARDIOGRAM COMPLETE: CPT

## 2024-10-10 PROCEDURE — 99215 OFFICE O/P EST HI 40 MIN: CPT

## 2024-10-10 PROCEDURE — G2211 COMPLEX E/M VISIT ADD ON: CPT

## 2024-10-11 LAB
ALBUMIN SERPL ELPH-MCNC: 3.5 G/DL
ALP BLD-CCNC: 112 U/L
ALT SERPL-CCNC: <5 U/L
ANION GAP SERPL CALC-SCNC: 14 MMOL/L
AST SERPL-CCNC: 15 U/L
BILIRUB SERPL-MCNC: 0.8 MG/DL
BUN SERPL-MCNC: 12 MG/DL
CALCIUM SERPL-MCNC: 9 MG/DL
CHLORIDE SERPL-SCNC: 108 MMOL/L
CHOLEST SERPL-MCNC: 114 MG/DL
CO2 SERPL-SCNC: 22 MMOL/L
CREAT SERPL-MCNC: 0.89 MG/DL
EGFR: 83 ML/MIN/1.73M2
ESTIMATED AVERAGE GLUCOSE: 123 MG/DL
GLUCOSE SERPL-MCNC: 90 MG/DL
HBA1C MFR BLD HPLC: 5.9 %
HDLC SERPL-MCNC: 37 MG/DL
LDLC SERPL CALC-MCNC: 64 MG/DL
NONHDLC SERPL-MCNC: 77 MG/DL
NT-PROBNP SERPL-MCNC: 1399 PG/ML
POTASSIUM SERPL-SCNC: 3.8 MMOL/L
PROT SERPL-MCNC: 6.9 G/DL
SODIUM SERPL-SCNC: 144 MMOL/L
TRIGL SERPL-MCNC: 57 MG/DL

## 2024-10-15 ENCOUNTER — RESULT REVIEW (OUTPATIENT)
Age: 89
End: 2024-10-15

## 2024-10-15 ENCOUNTER — APPOINTMENT (OUTPATIENT)
Dept: HEMATOLOGY ONCOLOGY | Facility: CLINIC | Age: 89
End: 2024-10-15
Payer: MEDICARE

## 2024-10-15 VITALS
RESPIRATION RATE: 16 BRPM | BODY MASS INDEX: 27.61 KG/M2 | HEART RATE: 77 BPM | DIASTOLIC BLOOD PRESSURE: 94 MMHG | OXYGEN SATURATION: 97 % | TEMPERATURE: 96.9 F | WEIGHT: 197.25 LBS | SYSTOLIC BLOOD PRESSURE: 148 MMHG | HEIGHT: 71 IN

## 2024-10-15 DIAGNOSIS — C61 MALIGNANT NEOPLASM OF PROSTATE: ICD-10-CM

## 2024-10-15 DIAGNOSIS — D64.9 ANEMIA, UNSPECIFIED: ICD-10-CM

## 2024-10-15 PROCEDURE — 99214 OFFICE O/P EST MOD 30 MIN: CPT

## 2024-10-15 PROCEDURE — 36415 COLL VENOUS BLD VENIPUNCTURE: CPT

## 2024-10-15 PROCEDURE — G2211 COMPLEX E/M VISIT ADD ON: CPT

## 2024-11-12 ENCOUNTER — APPOINTMENT (OUTPATIENT)
Dept: HEART AND VASCULAR | Facility: CLINIC | Age: 89
End: 2024-11-12

## 2024-11-12 ENCOUNTER — RX RENEWAL (OUTPATIENT)
Age: 89
End: 2024-11-12

## 2024-11-26 ENCOUNTER — RESULT REVIEW (OUTPATIENT)
Age: 89
End: 2024-11-26

## 2024-11-26 ENCOUNTER — APPOINTMENT (OUTPATIENT)
Dept: HEMATOLOGY ONCOLOGY | Facility: CLINIC | Age: 89
End: 2024-11-26

## 2024-11-26 VITALS
TEMPERATURE: 97.3 F | HEIGHT: 71 IN | WEIGHT: 185.38 LBS | BODY MASS INDEX: 25.95 KG/M2 | SYSTOLIC BLOOD PRESSURE: 141 MMHG | DIASTOLIC BLOOD PRESSURE: 80 MMHG | RESPIRATION RATE: 16 BRPM | HEART RATE: 85 BPM | OXYGEN SATURATION: 98 %

## 2024-12-02 ENCOUNTER — NON-APPOINTMENT (OUTPATIENT)
Age: 89
End: 2024-12-02

## 2024-12-02 ENCOUNTER — APPOINTMENT (OUTPATIENT)
Dept: HEMATOLOGY ONCOLOGY | Facility: CLINIC | Age: 89
End: 2024-12-02

## 2024-12-03 ENCOUNTER — RX RENEWAL (OUTPATIENT)
Age: 89
End: 2024-12-03

## 2025-01-14 ENCOUNTER — RESULT REVIEW (OUTPATIENT)
Age: 89
End: 2025-01-14

## 2025-01-14 ENCOUNTER — APPOINTMENT (OUTPATIENT)
Dept: HEMATOLOGY ONCOLOGY | Facility: CLINIC | Age: 89
End: 2025-01-14
Payer: MEDICARE

## 2025-01-14 VITALS
BODY MASS INDEX: 27.2 KG/M2 | WEIGHT: 194.31 LBS | DIASTOLIC BLOOD PRESSURE: 87 MMHG | RESPIRATION RATE: 16 BRPM | SYSTOLIC BLOOD PRESSURE: 151 MMHG | HEART RATE: 80 BPM | TEMPERATURE: 97.2 F | HEIGHT: 71 IN | OXYGEN SATURATION: 96 %

## 2025-01-14 DIAGNOSIS — E87.6 HYPOKALEMIA: ICD-10-CM

## 2025-01-14 DIAGNOSIS — C61 MALIGNANT NEOPLASM OF PROSTATE: ICD-10-CM

## 2025-01-14 PROCEDURE — 99214 OFFICE O/P EST MOD 30 MIN: CPT

## 2025-01-14 PROCEDURE — 36415 COLL VENOUS BLD VENIPUNCTURE: CPT

## 2025-01-14 PROCEDURE — G2211 COMPLEX E/M VISIT ADD ON: CPT

## 2025-01-15 RX ORDER — POTASSIUM CHLORIDE 1500 MG/1
20 TABLET, EXTENDED RELEASE ORAL
Qty: 15 | Refills: 3 | Status: ACTIVE | COMMUNITY
Start: 2025-01-15 | End: 1900-01-01

## 2025-01-23 ENCOUNTER — RX RENEWAL (OUTPATIENT)
Age: 89
End: 2025-01-23

## 2025-01-23 ENCOUNTER — NON-APPOINTMENT (OUTPATIENT)
Age: 89
End: 2025-01-23

## 2025-01-30 ENCOUNTER — APPOINTMENT (OUTPATIENT)
Dept: PODIATRY | Facility: CLINIC | Age: 89
End: 2025-01-30
Payer: MEDICARE

## 2025-01-30 DIAGNOSIS — B35.1 TINEA UNGUIUM: ICD-10-CM

## 2025-01-30 DIAGNOSIS — G89.29 PAIN IN LEFT FOOT: ICD-10-CM

## 2025-01-30 DIAGNOSIS — G89.29 PAIN IN RIGHT FOOT: ICD-10-CM

## 2025-01-30 DIAGNOSIS — M79.672 PAIN IN LEFT FOOT: ICD-10-CM

## 2025-01-30 DIAGNOSIS — I70.91 GENERALIZED ATHEROSCLEROSIS: ICD-10-CM

## 2025-01-30 DIAGNOSIS — M79.671 PAIN IN RIGHT FOOT: ICD-10-CM

## 2025-01-30 PROCEDURE — 11721 DEBRIDE NAIL 6 OR MORE: CPT

## 2025-04-15 ENCOUNTER — RESULT REVIEW (OUTPATIENT)
Age: 89
End: 2025-04-15

## 2025-04-15 ENCOUNTER — APPOINTMENT (OUTPATIENT)
Dept: HEMATOLOGY ONCOLOGY | Facility: CLINIC | Age: 89
End: 2025-04-15
Payer: MEDICARE

## 2025-04-15 VITALS
SYSTOLIC BLOOD PRESSURE: 91 MMHG | WEIGHT: 197.38 LBS | BODY MASS INDEX: 27.63 KG/M2 | RESPIRATION RATE: 16 BRPM | HEART RATE: 95 BPM | HEIGHT: 71 IN | DIASTOLIC BLOOD PRESSURE: 58 MMHG | OXYGEN SATURATION: 94 % | TEMPERATURE: 96.9 F

## 2025-04-15 DIAGNOSIS — C61 MALIGNANT NEOPLASM OF PROSTATE: ICD-10-CM

## 2025-04-15 DIAGNOSIS — D64.9 ANEMIA, UNSPECIFIED: ICD-10-CM

## 2025-04-15 PROCEDURE — G2211 COMPLEX E/M VISIT ADD ON: CPT

## 2025-04-15 PROCEDURE — 99215 OFFICE O/P EST HI 40 MIN: CPT

## 2025-04-15 PROCEDURE — 36415 COLL VENOUS BLD VENIPUNCTURE: CPT

## 2025-05-12 ENCOUNTER — RX RENEWAL (OUTPATIENT)
Age: 89
End: 2025-05-12

## 2025-08-22 ENCOUNTER — RX RENEWAL (OUTPATIENT)
Age: 89
End: 2025-08-22

## 2025-09-02 ENCOUNTER — RX RENEWAL (OUTPATIENT)
Age: 89
End: 2025-09-02